# Patient Record
Sex: MALE | Race: WHITE | NOT HISPANIC OR LATINO | Employment: OTHER | URBAN - METROPOLITAN AREA
[De-identification: names, ages, dates, MRNs, and addresses within clinical notes are randomized per-mention and may not be internally consistent; named-entity substitution may affect disease eponyms.]

---

## 2017-01-02 ENCOUNTER — HOSPITAL ENCOUNTER (OUTPATIENT)
Dept: PHYSICAL THERAPY | Facility: HOSPITAL | Age: 72
Setting detail: THERAPIES SERIES
Discharge: HOME | End: 2017-01-02

## 2017-01-02 DIAGNOSIS — Z98.1 STATUS POST LUMBAR SPINAL FUSION: Primary | ICD-10-CM

## 2017-01-02 PROCEDURE — 97113 AQUATIC THERAPY/EXERCISES: CPT | Performed by: PHYSICAL THERAPIST

## 2017-01-02 NOTE — PROGRESS NOTES
Outpatient Physical Therapy Ortho Re-Assessment  [facility]     Patient Name: Ruben Breen  : 1945  MRN: [id number]  Today's Date: 2017      Visit Date: 2017    There is no problem list on file for this patient.       No past medical history on file.     No past surgical history on file.    Visit Dx:     ICD-10-CM ICD-9-CM   1. Status post lumbar spinal fusion Z98.1 V45.4                                     PT OP Goals       17 1200 16 0900       PT Short Term Goals    STG Date to Achieve 16  -MEI 16  -MEI     STG 1 Patient to complete a 45 minute aquatic exercise program without exacerbation of symptoms.  -MEI Patient to complete a 45 minute aquatic exercise program without exacerbation of symptoms.  -MEI     STG 1 Progress Met  -MEI Met  -MEI     STG 2 Patient to ambulate independently without an assistive device safely.  -MEI Patient to ambulate independently without an assistive device safely.  -MEI     STG 2 Progress Met  -MEI Met  -MEI     Long Term Goals    LTG Date to Achieve 16  -MEI 16  -MEI     LTG 1 Through teach back method, patient to be independent in completing an exercise program to increase fllexibility and increase strength so he can ambulate farther.  -MEI Through teach back method, patient to be independent in completing an exercise program to increase fllexibility and increase strength so he can ambulate farther.  -MEI     LTG 1 Progress Partially Met  -MEI Partially Met  -MEI     LTG 1 Progress Comments  Independent with 80% of current exercises, program being progressed weekly.  -MEI     LTG 2 Patient to demonstrate increased lower extremity strength as is evidenced by improving 5x sit to stand score from 24 seconds to 18 seconds or less, without use of UEs.  -MEI Patient to demonstrate increased lower extremity strength as is evidenced by improving 5x sit to stand score from 24 seconds to 18 seconds or less, without use of UEs.  -MEI     LTG 2  Progress Met  -MEI Met  -MEI     LTG 3 Patient to be able to complete more activities of daily living as is evidenced by improving score on Oswestry Disability Index from 48% disability to less than 40% disability.  -MEI Patient to be able to complete more activities of daily living as is evidenced by improving score on Oswestry Disability Index from 48% disability to less than 40% disability.  -MEI     LTG 3 Progress Met  -MEI Progressing  -MEI     LTG 3 Progress Comments THOMAS 36%  -MEI      LTG 4 Improve proximal strength demonstrated by improving the 5 X sit to stand test from 18 sec. to 14 sec.  -MEI Improve proximal strength demonstrated by improving the 5 X sit to stand test from 18 sec. to 14 sec.  -MEI     LTG 4 Progress Met  -MEI New  -MEI     LTG 4 Progress Comments Completed in 9.5 sec.  -MEI        User Key  (r) = Recorded By, (t) = Taken By, (c) = Cosigned By    Initials Name Provider Type    MEI Orellana, PT Physical Therapist                PT Assessment/Plan       01/02/17 1242          PT Assessment    Functional Limitations Limitation in home management;Limitations in community activities;Performance in leisure activities  -MEI      Impairments Endurance;Gait;Muscle strength;Pain;Range of motion;Posture  -MEI      Assessment Comments Mr Jamshid demonstrates good progress from one month ago. His back pain is little to none depending on his activity. He continues to walk almost daily. He reports continued R knee pain (that has been long standing). Oswestry demonstrates improvement from 48% to 36%.  The 5 X sit to stand test improved significantly from initial time of  24 seconds to 9.5 seconds (WNL) today.    -MEI      Please refer to paper survey for additional self-reported information Yes  -MEI      Rehab Potential Good  -MEI      Patient/caregiver participated in establishment of treatment plan and goals Yes  -MEI      Patient would benefit from skilled therapy intervention Yes  -MEI      PT Plan    PT Frequency  2x/week  -MEI      Predicted Duration of Therapy Intervention (days/wks) 1-2 weeks  -MEI      Planned CPT's? PT AQUATIC THERAPY EA 15 MIN: [id number]  -MEI      Physical Therapy Interventions (Optional Details) aquatics exercise;lumbar stabilization;strengthening;stretching  -MEI      PT Plan Comments 1-2 more sessions to finalize program.  -MEI        User Key  (r) = Recorded By, (t) = Taken By, (c) = Cosigned By    Initials Name Provider Type    MEI Orellana, PT Physical Therapist                  Exercises       01/02/17 1200          Subjective Comments    Subjective Comments Saw the surgeon and I can return to physical activity, but no push ups or jogging and weight lifting only with back support such as lying or leaning against bench.  -MEI      Subjective Pain    Able to rate subjective pain? yes  -MEI      Pre-Treatment Pain Level 3  -MEI      Post-Treatment Pain Level 3  -MEI      Subjective Pain Comment My back only bothers me if I move the wrong way, but my knee is still hurting and grinding. It does that it goes in cycles or could be the weather.  -MEI      Aquatics    Aquatics performed? Yes  -MEI      Exercise 1    Exercise Name 1 Refer to flow sheet. Progressed to hydrotone bells.  -MEI        User Key  (r) = Recorded By, (t) = Taken By, (c) = Cosigned By    Initials Name Provider Type    MEI Orellana, PT Physical Therapist                              Time Calculation:   Start Time: 0900  Stop Time: 0940  Time Calculation (min): 40 min     Therapy Charges for Today     Code Description Service Date Service Provider Modifiers Qty    32779921599  PT AQUATIC THERAPY EA 15 MIN 1/2/2017 Dameon Orellana, PT GP 3                    Dameon Orellana, PT  1/2/2017

## 2017-01-04 ENCOUNTER — HOSPITAL ENCOUNTER (OUTPATIENT)
Dept: PHYSICAL THERAPY | Facility: HOSPITAL | Age: 72
Setting detail: THERAPIES SERIES
End: 2017-01-04

## 2017-01-09 ENCOUNTER — HOSPITAL ENCOUNTER (OUTPATIENT)
Dept: PHYSICAL THERAPY | Facility: HOSPITAL | Age: 72
Setting detail: THERAPIES SERIES
Discharge: HOME | End: 2017-01-09

## 2017-01-09 DIAGNOSIS — Z98.1 STATUS POST LUMBAR SPINAL FUSION: Primary | ICD-10-CM

## 2017-01-09 PROCEDURE — G8979 MOBILITY GOAL STATUS: HCPCS | Performed by: PHYSICAL THERAPIST

## 2017-01-09 PROCEDURE — 97113 AQUATIC THERAPY/EXERCISES: CPT | Performed by: PHYSICAL THERAPIST

## 2017-01-09 PROCEDURE — G8980 MOBILITY D/C STATUS: HCPCS | Performed by: PHYSICAL THERAPIST

## 2017-01-09 NOTE — THERAPY DISCHARGE NOTE
Outpatient Physical Therapy Discharge Summary  [facility]       Patient Name: Ruben Breen  : 1945  MRN: [id number]    Today's Date: 2017              PT OP Goals       17 0900 17 1200       PT Short Term Goals    STG Date to Achieve 16  -MEI 16  -MEI     STG 1 Patient to complete a 45 minute aquatic exercise program without exacerbation of symptoms.  -MEI Patient to complete a 45 minute aquatic exercise program without exacerbation of symptoms.  -MEI     STG 1 Progress Met  -MEI Met  -MEI     STG 2 Patient to ambulate independently without an assistive device safely.  -MEI Patient to ambulate independently without an assistive device safely.  -MEI     STG 2 Progress Met  -MEI Met  -MEI     Long Term Goals    LTG Date to Achieve 16  -MEI 16  -MEI     LTG 1 Through teach back method, patient to be independent in completing an exercise program to increase fllexibility and increase strength so he can ambulate farther.  -MEI Through teach back method, patient to be independent in completing an exercise program to increase fllexibility and increase strength so he can ambulate farther.  -MEI     LTG 1 Progress Met  -MEI Partially Met  -MEI     LTG 2 Patient to demonstrate increased lower extremity strength as is evidenced by improving 5x sit to stand score from 24 seconds to 18 seconds or less, without use of UEs.  -MEI Patient to demonstrate increased lower extremity strength as is evidenced by improving 5x sit to stand score from 24 seconds to 18 seconds or less, without use of UEs.  -MEI     LTG 2 Progress Met  -MEI Met  -MEI     LTG 3 Patient to be able to complete more activities of daily living as is evidenced by improving score on Oswestry Disability Index from 48% disability to less than 40% disability.  -MEI Patient to be able to complete more activities of daily living as is evidenced by improving score on Oswestry Disability Index from 48% disability to less than 40%  disability.  -MEI     LTG 3 Progress Met  -MEI Met  -MEI     LTG 3 Progress Comments  THOMAS 36%  -MEI     LTG 4 Improve proximal strength demonstrated by improving the 5 X sit to stand test from 18 sec. to 14 sec.  -MIE Improve proximal strength demonstrated by improving the 5 X sit to stand test from 18 sec. to 14 sec.  -MEI     LTG 4 Progress Met  -MEI Met  -MEI     LTG 4 Progress Comments  Completed in 9.5 sec.  -MEI       User Key  (r) = Recorded By, (t) = Taken By, (c) = Cosigned By    Initials Name Provider Type    MEI Orellana, PT Physical Therapist          OP PT Discharge Summary  Date of Discharge: 01/09/17  Reason for Discharge: All goals achieved  Outcomes Achieved: Able to achieve all goals within established timeline  Discharge Destination: Home with home program  Discharge Instructions: Mr Breen has made steady progress throughout the course of physical therapy.  His back pain has reduced to little to none at times.  He reports continued right knee pain and restricted ROM that has been long standing reportedly due to need for revision surgery.  Mr Breen was encouraged to continue with aquatic exercise  and change his land based walking program  to water walking, he is in agreement.        Time Calculation:   Start Time: 0902  Stop Time: 0945  Time Calculation (min): 43 min    Therapy Charges for Today     Code Description Service Date Service Provider Modifiers Qty    90089601901 HC PT AQUATIC THERAPY EA 15 MIN 1/9/2017 Dameon Orellana, PT GP 3    99305025989 HC PT MOBILITY PROJECTED 1/9/2017 Dameon Orellana, PT GP, CJ 1    [id number] HC PT MOBILITY DISCHARGE 1/9/2017 Dameon Orellana, PT GP, CJ 1          PT G-Codes  PT Professional Judgement Used?: Yes  Outcome Measure Options: 5x Sit to Stand, Modifed Owestry  Score: THOMAS 36%  Functional Limitation: Mobility: Walking and moving around  Mobility: Walking and Moving Around Goal Status (): At least 20 percent but less than 40 percent impaired, limited or  restricted  Mobility: Walking and Moving Around Discharge Status (): At least 20 percent but less than 40 percent impaired, limited or restricted       Dameon Orellana, PT  1/9/2017

## 2017-01-11 ENCOUNTER — APPOINTMENT (OUTPATIENT)
Dept: PHYSICAL THERAPY | Facility: HOSPITAL | Age: 72
End: 2017-01-11

## 2017-01-16 ENCOUNTER — APPOINTMENT (OUTPATIENT)
Dept: PHYSICAL THERAPY | Facility: HOSPITAL | Age: 72
End: 2017-01-16

## 2021-02-22 ENCOUNTER — TELEPHONE (OUTPATIENT)
Dept: GASTROENTEROLOGY | Facility: CLINIC | Age: 76
End: 2021-02-22

## 2021-02-22 NOTE — TELEPHONE ENCOUNTER
2/22/21 - I spoke to his niece, Dr. Jeanette Ceja, yesterday that he just finished XRT for prostate cancer and is having some rectal troubles. His doctors at the [facility] were recommending that he see a Colon Rectal surgeon and possibly have hemorrhoid surgery. He wants a second opinion.     RW - please work him in to see me in the next two weeks. thx.kjh

## 2021-05-20 ENCOUNTER — TELEPHONE (OUTPATIENT)
Dept: GASTROENTEROLOGY | Facility: CLINIC | Age: 76
End: 2021-05-20

## 2021-05-20 NOTE — TELEPHONE ENCOUNTER
5/20/21 - RW - I spoke to his niece, Dr. Jeanette Ceja, recently that he just finished XRT for prostate cancer and is having some rectal troubles. His doctors at the [facility] were recommending that he see a Colon Rectal surgeon and possibly have hemorrhoid surgery. He wants a second opinion.      RW - please work him in to see me in the next few weeks if possible. thx.kjh

## 2021-06-16 ENCOUNTER — OFFICE VISIT (OUTPATIENT)
Dept: GASTROENTEROLOGY | Facility: CLINIC | Age: 76
End: 2021-06-16

## 2021-06-16 ENCOUNTER — LAB (OUTPATIENT)
Dept: LAB | Facility: HOSPITAL | Age: 76
End: 2021-06-16

## 2021-06-16 VITALS
SYSTOLIC BLOOD PRESSURE: 140 MMHG | HEIGHT: 69 IN | DIASTOLIC BLOOD PRESSURE: 80 MMHG | BODY MASS INDEX: 30.66 KG/M2 | WEIGHT: 207 LBS

## 2021-06-16 DIAGNOSIS — K59.00 CONSTIPATION, UNSPECIFIED CONSTIPATION TYPE: Primary | ICD-10-CM

## 2021-06-16 DIAGNOSIS — K64.4 RESIDUAL HEMORRHOIDAL SKIN TAGS: ICD-10-CM

## 2021-06-16 LAB
ALBUMIN SERPL-MCNC: 4.6 G/DL (ref 3.5–5.2)
ALBUMIN/GLOB SERPL: 1.8 G/DL
ALP SERPL-CCNC: 74 U/L (ref 39–117)
ALT SERPL W P-5'-P-CCNC: 21 U/L (ref 1–41)
ANION GAP SERPL CALCULATED.3IONS-SCNC: 11.9 MMOL/L (ref 5–15)
AST SERPL-CCNC: 19 U/L (ref 1–40)
BASOPHILS # BLD AUTO: 0.03 10*3/MM3 (ref 0–0.2)
BASOPHILS NFR BLD AUTO: 0.5 % (ref 0–1.5)
BILIRUB SERPL-MCNC: 0.5 MG/DL (ref 0–1.2)
BUN SERPL-MCNC: 22 MG/DL (ref 8–23)
BUN/CREAT SERPL: 17.1 (ref 7–25)
CALCIUM SPEC-SCNC: 10.3 MG/DL (ref 8.6–10.5)
CHLORIDE SERPL-SCNC: 106 MMOL/L (ref 98–107)
CO2 SERPL-SCNC: 27.1 MMOL/L (ref 22–29)
CREAT SERPL-MCNC: 1.29 MG/DL (ref 0.76–1.27)
DEPRECATED RDW RBC AUTO: 47.1 FL (ref 37–54)
EOSINOPHIL # BLD AUTO: 0.25 10*3/MM3 (ref 0–0.4)
EOSINOPHIL NFR BLD AUTO: 4 % (ref 0.3–6.2)
ERYTHROCYTE [DISTWIDTH] IN BLOOD BY AUTOMATED COUNT: 13.3 % (ref 12.3–15.4)
GFR SERPL CREATININE-BSD FRML MDRD: 54 ML/MIN/1.73
GLOBULIN UR ELPH-MCNC: 2.6 GM/DL
GLUCOSE SERPL-MCNC: 107 MG/DL (ref 65–99)
HCT VFR BLD AUTO: 40.9 % (ref 37.5–51)
HGB BLD-MCNC: 13.5 G/DL (ref 13–17.7)
IMM GRANULOCYTES # BLD AUTO: 0.02 10*3/MM3 (ref 0–0.05)
IMM GRANULOCYTES NFR BLD AUTO: 0.3 % (ref 0–0.5)
LYMPHOCYTES # BLD AUTO: 1.06 10*3/MM3 (ref 0.7–3.1)
LYMPHOCYTES NFR BLD AUTO: 17 % (ref 19.6–45.3)
MCH RBC QN AUTO: 31.7 PG (ref 26.6–33)
MCHC RBC AUTO-ENTMCNC: 33 G/DL (ref 31.5–35.7)
MCV RBC AUTO: 96 FL (ref 79–97)
MONOCYTES # BLD AUTO: 0.42 10*3/MM3 (ref 0.1–0.9)
MONOCYTES NFR BLD AUTO: 6.7 % (ref 5–12)
NEUTROPHILS NFR BLD AUTO: 4.45 10*3/MM3 (ref 1.7–7)
NEUTROPHILS NFR BLD AUTO: 71.5 % (ref 42.7–76)
NRBC BLD AUTO-RTO: 0 /100 WBC (ref 0–0.2)
PLATELET # BLD AUTO: 200 10*3/MM3 (ref 140–450)
PMV BLD AUTO: 10.4 FL (ref 6–12)
POTASSIUM SERPL-SCNC: 4.6 MMOL/L (ref 3.5–5.2)
PROT SERPL-MCNC: 7.2 G/DL (ref 6–8.5)
RBC # BLD AUTO: 4.26 10*6/MM3 (ref 4.14–5.8)
SODIUM SERPL-SCNC: 145 MMOL/L (ref 136–145)
TSH SERPL DL<=0.05 MIU/L-ACNC: 1.5 UIU/ML (ref 0.27–4.2)
WBC # BLD AUTO: 6.23 10*3/MM3 (ref 3.4–10.8)

## 2021-06-16 PROCEDURE — 99204 OFFICE O/P NEW MOD 45 MIN: CPT | Performed by: INTERNAL MEDICINE

## 2021-06-16 PROCEDURE — 83516 IMMUNOASSAY NONANTIBODY: CPT | Performed by: INTERNAL MEDICINE

## 2021-06-16 PROCEDURE — 36415 COLL VENOUS BLD VENIPUNCTURE: CPT | Performed by: INTERNAL MEDICINE

## 2021-06-16 PROCEDURE — 82784 ASSAY IGA/IGD/IGG/IGM EACH: CPT | Performed by: INTERNAL MEDICINE

## 2021-06-16 PROCEDURE — 80053 COMPREHEN METABOLIC PANEL: CPT | Performed by: INTERNAL MEDICINE

## 2021-06-16 PROCEDURE — 84443 ASSAY THYROID STIM HORMONE: CPT | Performed by: INTERNAL MEDICINE

## 2021-06-16 PROCEDURE — 85025 COMPLETE CBC W/AUTO DIFF WBC: CPT | Performed by: INTERNAL MEDICINE

## 2021-06-16 RX ORDER — FINASTERIDE 1 MG/1
1 TABLET, FILM COATED ORAL DAILY
COMMUNITY
End: 2021-09-16

## 2021-06-16 RX ORDER — TRAMADOL HYDROCHLORIDE 50 MG/1
50 TABLET ORAL EVERY 6 HOURS PRN
COMMUNITY

## 2021-06-16 RX ORDER — GABAPENTIN 400 MG/1
400 CAPSULE ORAL DAILY PRN
COMMUNITY

## 2021-06-16 RX ORDER — GLIPIZIDE 10 MG/1
10 TABLET ORAL
COMMUNITY
End: 2022-06-09 | Stop reason: DRUGHIGH

## 2021-06-16 RX ORDER — AMMONIUM LACTATE 120 MG/G
1 CREAM TOPICAL AS NEEDED
COMMUNITY

## 2021-06-16 RX ORDER — DOCUSATE SODIUM 250 MG
250 CAPSULE ORAL DAILY
COMMUNITY

## 2021-06-16 RX ORDER — LISINOPRIL 2.5 MG/1
2.5 TABLET ORAL DAILY
COMMUNITY

## 2021-06-16 RX ORDER — TRAZODONE HYDROCHLORIDE 50 MG/1
50 TABLET ORAL NIGHTLY
COMMUNITY

## 2021-06-16 RX ORDER — TAMSULOSIN HYDROCHLORIDE 0.4 MG/1
1 CAPSULE ORAL DAILY
COMMUNITY

## 2021-06-16 RX ORDER — COLCHICINE 0.6 MG/1
0.6 TABLET ORAL DAILY
COMMUNITY

## 2021-06-16 RX ORDER — CLINDAMYCIN PHOSPHATE 10 MG/G
1 GEL TOPICAL 2 TIMES DAILY
COMMUNITY

## 2021-06-16 RX ORDER — ATORVASTATIN CALCIUM 20 MG/1
20 TABLET, FILM COATED ORAL DAILY
COMMUNITY

## 2021-06-16 RX ORDER — POTASSIUM CITRATE 10 MEQ/1
10 TABLET, EXTENDED RELEASE ORAL DAILY PRN
COMMUNITY

## 2021-06-16 RX ORDER — DIAPER,BRIEF,INFANT-TODD,DISP
1 EACH MISCELLANEOUS 2 TIMES DAILY
COMMUNITY

## 2021-06-16 NOTE — PROGRESS NOTES
Chief Complaint   Patient presents with   • Hemorrhoids       History of Present Illness:   75 y.o. male who is related to Dr. 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       He has prostate cancer diagnosed in 9/20. He is on hormonal therapy now. He did have radiation therapy (finished 11/20). He has had hemorrhoidal banding 1 yr ago by [facility]. The Colon Rectal Surgeon was recommending hemorrhoid surgery to be done again to removed hemorrhoids that have been left over. They are worried that he will be incontinent after surgery. NO rectal bleeding. When he has a large BM he will notice something sticking out of his anus. He may have trouble cleaning himself after BM sometimes. He is on 3 Docusate pills/day and Miralax daily. Prior to being on Docusate and Miralax he was constipated. He had had loose BM on the docusate/Miralax.  The docusate has been stopped by his PCP, Dr. Russell. One BM yesterday and today, neither one was loose like before. No anal pain. No abdominal or chest pain. No nausea or vomiting. NO fevers, chills. Weight stable.        I did review his [facility] records (112 pages).    Past Medical History:   Diagnosis Date   • Diabetes mellitus (CMS/HCC)    • Hypertension    • Kidney stones    • Prostate cancer (CMS/HCC) 09/2020       Past Surgical History:   Procedure Laterality Date   • COLONOSCOPY  2020   • KNEE SURGERY  2014   • KNEE  SURGERY  2017   • NEPHRECTOMY  1990   • SPINE SURGERY  2014   • SPINE SURGERY  2018         Current Outpatient Medications:   •  ammonium lactate (AMLACTIN) 12 % cream, Apply  topically to the appropriate area as directed As Needed for Dry Skin., Disp: , Rfl:   •  atorvastatin (LIPITOR) 20 MG tablet, Take 20 mg by mouth Daily., Disp: , Rfl:   •  clindamycin (CLINDAGEL) 1 % gel, Apply  topically to the appropriate area as directed 2 (Two) Times a Day., Disp: , Rfl:   •  colchicine 0.6 MG tablet, Take 0.6 mg by mouth Daily., Disp: , Rfl:   •  diclofenac sodium (VOTAREN XR) 100 MG 24 hr tablet, Take 100 mg by mouth Daily., Disp: , Rfl:   •  docusate sodium (COLACE) 250 MG capsule, Take 250 mg by mouth Daily., Disp: , Rfl:   •  finasteride (PROPECIA) 1 MG tablet, Take 1 mg by mouth Daily., Disp: , Rfl:   •  gabapentin (NEURONTIN) 400 MG capsule, Take 400 mg by mouth 3 (Three) Times a Day., Disp: , Rfl:   •  glipizide (GLUCOTROL) 10 MG tablet, Take 10 mg by mouth 2 (Two) Times a Day Before Meals., Disp: , Rfl:   •  hydrocortisone 1 % cream, Apply  topically to the appropriate area as directed 2 (Two) Times a Day., Disp: , Rfl:   •  lisinopril (PRINIVIL,ZESTRIL) 2.5 MG tablet, Take 2.5 mg by mouth Daily., Disp: , Rfl:   •  potassium citrate (UROCIT-K) 10 MEQ (1080 MG) CR tablet, Take 10 mEq by mouth 3 (Three) Times a Day With Meals., Disp: , Rfl:   •  tamsulosin (FLOMAX) 0.4 MG capsule 24 hr capsule, Take 1 capsule by mouth Daily., Disp: , Rfl:   •  traMADol (ULTRAM) 50 MG tablet, Take 50 mg by mouth Every 6 (Six) Hours As Needed for Moderate Pain ., Disp: , Rfl:   •  traZODone (DESYREL) 50 MG tablet, Take 50 mg by mouth Every Night., Disp: , Rfl:     No Known Allergies    History reviewed. No pertinent family history.    Social History     Socioeconomic History   • Marital status:      Spouse name: Not on file   • Number of children: Not on file   • Years of education: Not on file   • Highest education level: Not  on file   Tobacco Use   • Smoking status: Never Smoker   • Smokeless tobacco: Never Used   Substance and Sexual Activity   • Alcohol use: Never   • Drug use: Never       Review of Systems   Gastrointestinal: Positive for constipation. Negative for abdominal pain.     Pertinent positives and negatives documented in the HPI and all other systems reviewed and were found to be negative.  Vitals:    06/16/21 1107   BP: 140/80       Physical Exam  Vitals reviewed.   Constitutional:       General: He is not in acute distress.     Appearance: Normal appearance. He is well-developed. He is not diaphoretic.   HENT:      Head: Normocephalic and atraumatic. Hair is normal.      Right Ear: Hearing, tympanic membrane, ear canal and external ear normal.      Left Ear: Hearing, tympanic membrane, ear canal and external ear normal.      Nose: Nose normal. No nasal deformity.      Mouth/Throat:      Mouth: Mucous membranes are moist. No oral lesions.      Pharynx: Uvula midline. No uvula swelling.   Eyes:      General: Lids are normal. No scleral icterus.        Right eye: No discharge.         Left eye: No discharge.      Extraocular Movements: Extraocular movements intact.      Right eye: Normal extraocular motion and no nystagmus.      Left eye: Normal extraocular motion and no nystagmus.      Conjunctiva/sclera: Conjunctivae normal.      Pupils: Pupils are equal, round, and reactive to light.   Neck:      Thyroid: No thyromegaly.      Vascular: No JVD.   Cardiovascular:      Rate and Rhythm: Normal rate and regular rhythm.      Pulses: Normal pulses.      Heart sounds: Normal heart sounds. No murmur heard.   No gallop.    Pulmonary:      Effort: Pulmonary effort is normal. No respiratory distress.      Breath sounds: Normal breath sounds. No wheezing or rales.   Chest:      Chest wall: No tenderness.   Abdominal:      General: Bowel sounds are normal. There is no distension.      Palpations: Abdomen is soft. There is no mass.      " Tenderness: There is no abdominal tenderness. There is no guarding.      Hernia: No hernia is present.   Genitourinary:     Rectum: Guaiac result negative.      Comments: External hemorrhoids, soft  Musculoskeletal:         General: No tenderness or deformity. Normal range of motion.      Cervical back: Normal range of motion and neck supple.   Lymphadenopathy:      Cervical: No cervical adenopathy.   Skin:     General: Skin is warm and dry.      Findings: No rash.   Neurological:      Mental Status: He is alert and oriented to person, place, and time.      Cranial Nerves: No cranial nerve deficit.      Motor: No abnormal muscle tone.      Coordination: Coordination normal.      Deep Tendon Reflexes: Reflexes are normal and symmetric. Reflexes normal.   Psychiatric:         Mood and Affect: Mood normal.         Behavior: Behavior normal.         Thought Content: Thought content normal.         Judgment: Judgment normal.         Diagnoses and all orders for this visit:    1. Constipation, unspecified constipation type (Primary)  -     CBC & Differential  -     Celiac Ab tTG DGP TIgA  -     Comprehensive Metabolic Panel  -     TSH    2. Residual hemorrhoidal skin tags  -     CBC & Differential  -     Celiac Ab tTG DGP TIgA  -     Comprehensive Metabolic Panel  -     TSH      Assessment:  1. H/o hemorrhoids and \"problems\" cleaning himself after BM  2. Consitpation  3.     Recommendations:  1. Hold off on hemorrhoid surgery for now. He does not want to have hemorrhoid surgery now because of fear of potential side effect of stool incontinence.   2. Continue off of the docusate sodium but continue the Miralax daily to decrease constiaption. I also want to decrease his loose bowels which seem to make cleaning himself after BM so difficult.  He can adjust the MiraLAX to aim for 1 bowel movement a day but I would like to make his bowels less loose.  3. F/u 3 mos.   4. CBC, CMP, TSH, celiac sprue    No follow-ups on " file.    Valentino Santiago MD  6/16/2021

## 2021-06-17 LAB
GLIADIN PEPTIDE IGA SER-ACNC: 5 UNITS (ref 0–19)
GLIADIN PEPTIDE IGG SER-ACNC: 1 UNITS (ref 0–19)
IGA SERPL-MCNC: 171 MG/DL (ref 61–437)
TTG IGA SER-ACNC: <2 U/ML (ref 0–3)
TTG IGG SER-ACNC: <2 U/ML (ref 0–5)

## 2021-06-18 NOTE — PROGRESS NOTES
06/18/21       Tell him that the following lab tests came back normal: Celiac sprue antibody panel, thyroid-stimulating hormone, and CBC.  His comprehensive metabolic profile shows that his glucose is 107 (which is just mildly elevated) and his serum creatinine is 1.29 (which is similar to what it was about one year ago at the [facility]).       Please fax a copy of this report to his PCP, Dr. Russell.  Thx. kj

## 2021-06-21 ENCOUNTER — TELEPHONE (OUTPATIENT)
Dept: GASTROENTEROLOGY | Facility: CLINIC | Age: 76
End: 2021-06-21

## 2021-06-21 NOTE — TELEPHONE ENCOUNTER
Called pt and advised of Dr Santiago's note. Verb understanding.      Records sent to Dr Novak thru [facility].

## 2021-06-21 NOTE — TELEPHONE ENCOUNTER
----- Message from Valentino Santiago MD sent at 6/18/2021  4:45 PM EDT -----  06/18/21       Tell him that the following lab tests came back normal: Celiac sprue antibody panel, thyroid-stimulating hormone, and CBC.  His comprehensive metabolic profile shows that his glucose is 107 (which is just mildly elevated) and his serum creatinine is 1.29 (which is similar to what it was about one year ago at the [facility]).       Please fax a copy of this report to his PCP, Dr. Russell.  Thx. [facility]

## 2021-09-16 ENCOUNTER — OFFICE VISIT (OUTPATIENT)
Dept: GASTROENTEROLOGY | Facility: CLINIC | Age: 76
End: 2021-09-16

## 2021-09-16 VITALS
TEMPERATURE: 97.2 F | BODY MASS INDEX: 30.51 KG/M2 | WEIGHT: 206 LBS | DIASTOLIC BLOOD PRESSURE: 74 MMHG | SYSTOLIC BLOOD PRESSURE: 128 MMHG | HEIGHT: 69 IN

## 2021-09-16 DIAGNOSIS — D64.9 ANEMIA, UNSPECIFIED TYPE: Primary | ICD-10-CM

## 2021-09-16 DIAGNOSIS — K63.5 POLYP OF COLON, UNSPECIFIED PART OF COLON, UNSPECIFIED TYPE: ICD-10-CM

## 2021-09-16 DIAGNOSIS — K59.00 CONSTIPATION, UNSPECIFIED CONSTIPATION TYPE: ICD-10-CM

## 2021-09-16 PROBLEM — Z98.890 HX OF LUMBOSACRAL SPINE SURGERY: Status: ACTIVE | Noted: 2017-04-11

## 2021-09-16 PROBLEM — IMO0002 PSEUDOARTHROSIS OF SPINE: Status: ACTIVE | Noted: 2021-07-30

## 2021-09-16 PROBLEM — M17.12 ARTHRITIS OF LEFT KNEE: Status: ACTIVE | Noted: 2018-03-05

## 2021-09-16 PROBLEM — M54.6 PAIN IN THORACIC SPINE: Status: ACTIVE | Noted: 2021-07-02

## 2021-09-16 PROBLEM — M17.12 PRIMARY OSTEOARTHRITIS OF LEFT KNEE: Status: ACTIVE | Noted: 2018-02-02

## 2021-09-16 PROBLEM — T84.019A PROSTHETIC JOINT IMPLANT FAILURE (HCC): Status: ACTIVE | Noted: 2018-04-17

## 2021-09-16 PROBLEM — M00.80: Status: ACTIVE | Noted: 2021-07-02

## 2021-09-16 PROBLEM — T84.216A HARDWARE FAILURE OF ANTERIOR COLUMN OF SPINE (HCC): Status: ACTIVE | Noted: 2021-07-02

## 2021-09-16 PROBLEM — M54.50 LUMBAR PAIN: Status: ACTIVE | Noted: 2021-07-02

## 2021-09-16 PROBLEM — B96.5: Status: ACTIVE | Noted: 2021-07-02

## 2021-09-16 PROBLEM — Z96.651 S/P TOTAL KNEE REPLACEMENT, RIGHT: Status: ACTIVE | Noted: 2018-05-02

## 2021-09-16 PROBLEM — Z98.1 HISTORY OF FUSION OF THORACIC SPINE: Status: ACTIVE | Noted: 2021-07-02

## 2021-09-16 PROCEDURE — 99214 OFFICE O/P EST MOD 30 MIN: CPT | Performed by: INTERNAL MEDICINE

## 2021-09-16 RX ORDER — FINASTERIDE 5 MG/1
5 TABLET, FILM COATED ORAL DAILY
COMMUNITY

## 2021-09-16 RX ORDER — ALBUTEROL SULFATE 90 UG/1
2 AEROSOL, METERED RESPIRATORY (INHALATION) 4 TIMES DAILY
COMMUNITY
Start: 2021-08-18 | End: 2022-05-19

## 2021-09-16 NOTE — PROGRESS NOTES
"Chief Complaint   Patient presents with   • Follow-up       History of Present Illness:   75 y.o. male who is related to Dr. 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  The patient was uptight about potential complications with the second surgery.  At that time my assessment and recommendations were as follows:  Assessment:  1. H/o hemorrhoids and \"problems\" cleaning himself after BM  2. Consitpation  3.      Recommendations:  1. Hold off on hemorrhoid surgery for now. He does not want to have hemorrhoid surgery now because of fear of potential side effect of stool incontinence.   2. Continue off of the docusate sodium but continue the Miralax daily to decrease constiaption. I also want to decrease his loose bowels which seem to make cleaning himself after BM so difficult.  He can adjust the MiraLAX to aim for 1 bowel movement a day but I would like to make his bowels less loose.  3. F/u 3 mos.   4. CBC, CMP, TSH, celiac sprue        Stopping Docusate helped making the stool more formed. Still with problems cleaning himself. No constipation. No diarrhea. No rectal bleeding or melena. No abdominal or chest pain. No nausea or vomiting. Weight unchanged.  Takes Miralax daily.        4 weeks ago he had emergency spine surgery (#6 surgery in his back) because one of his spine rods broke. He also had a UTI for which he went to the [facility] ER. He went home. No back pain.      Past Medical History:   Diagnosis Date   • Diabetes mellitus (CMS/HCC)    • Hypertension    • Kidney stones    • Prostate cancer " (CMS/[facility]) 09/2020       Past Surgical History:   Procedure Laterality Date   • COLONOSCOPY  2020   • KNEE SURGERY  2014   • KNEE SURGERY  2017   • NEPHRECTOMY  1990   • SPINE SURGERY  2014   • SPINE SURGERY  2018         Current Outpatient Medications:   •  albuterol sulfate  (90 Base) MCG/ACT inhaler, Inhale 2 puffs 4 (Four) Times a Day., Disp: , Rfl:   •  ammonium lactate (AMLACTIN) 12 % cream, Apply  topically to the appropriate area as directed As Needed for Dry Skin., Disp: , Rfl:   •  Aspirin 81 MG capsule, Take 1 capsule by mouth Daily., Disp: , Rfl:   •  atorvastatin (LIPITOR) 20 MG tablet, Take 20 mg by mouth Daily., Disp: , Rfl:   •  bevacizumab (AVASTIN) 100 MG/4ML chemo injection, Inject 1 mg into the eye Every 3 (Three) Months., Disp: , Rfl:   •  clindamycin (CLINDAGEL) 1 % gel, Apply  topically to the appropriate area as directed 2 (Two) Times a Day., Disp: , Rfl:   •  colchicine 0.6 MG tablet, Take 0.6 mg by mouth Daily., Disp: , Rfl:   •  diclofenac sodium (VOTAREN XR) 100 MG 24 hr tablet, Take 100 mg by mouth Daily., Disp: , Rfl:   •  docusate sodium (COLACE) 250 MG capsule, Take 250 mg by mouth Daily., Disp: , Rfl:   •  finasteride (PROSCAR) 5 MG tablet, Take 5 mg by mouth Daily., Disp: , Rfl:   •  gabapentin (NEURONTIN) 400 MG capsule, Take 400 mg by mouth 3 (Three) Times a Day., Disp: , Rfl:   •  glipizide (GLUCOTROL) 10 MG tablet, Take 10 mg by mouth 2 (Two) Times a Day Before Meals., Disp: , Rfl:   •  hydrocortisone 1 % cream, Apply  topically to the appropriate area as directed 2 (Two) Times a Day., Disp: , Rfl:   •  lisinopril (PRINIVIL,ZESTRIL) 2.5 MG tablet, Take 2.5 mg by mouth Daily., Disp: , Rfl:   •  potassium citrate (UROCIT-K) 10 MEQ (1080 MG) CR tablet, Take 10 mEq by mouth 3 (Three) Times a Day With Meals., Disp: , Rfl:   •  tamsulosin (FLOMAX) 0.4 MG capsule 24 hr capsule, Take 1 capsule by mouth Daily., Disp: , Rfl:   •  traMADol (ULTRAM) 50 MG tablet, Take 50 mg by mouth  Every 6 (Six) Hours As Needed for Moderate Pain ., Disp: , Rfl:   •  traZODone (DESYREL) 50 MG tablet, Take 50 mg by mouth Every Night., Disp: , Rfl:     No Known Allergies    History reviewed. No pertinent family history.    Social History     Socioeconomic History   • Marital status:      Spouse name: Not on file   • Number of children: Not on file   • Years of education: Not on file   • Highest education level: Not on file   Tobacco Use   • Smoking status: Never Smoker   • Smokeless tobacco: Never Used   Vaping Use   • Vaping Use: Never used   Substance and Sexual Activity   • Alcohol use: Never   • Drug use: Never       Review of Systems   Gastrointestinal: Negative for abdominal pain, constipation and diarrhea.   All other systems reviewed and are negative.    Pertinent positives and negatives documented in the HPI and all other systems reviewed and were found to be negative.  Vitals:    09/16/21 0819   BP: 128/74   Temp: 97.2 °F (36.2 °C)       Physical Exam  Vitals reviewed.   Constitutional:       General: He is not in acute distress.     Appearance: Normal appearance. He is well-developed. He is not diaphoretic.   HENT:      Head: Normocephalic and atraumatic. Hair is normal.      Right Ear: Hearing, tympanic membrane, ear canal and external ear normal.      Left Ear: Hearing, tympanic membrane, ear canal and external ear normal.      Nose: Nose normal. No nasal deformity.      Mouth/Throat:      Mouth: Mucous membranes are moist. No oral lesions.      Pharynx: Uvula midline. No uvula swelling.   Eyes:      General: Lids are normal. No scleral icterus.        Right eye: No discharge.         Left eye: No discharge.      Extraocular Movements: Extraocular movements intact.      Right eye: Normal extraocular motion and no nystagmus.      Left eye: Normal extraocular motion and no nystagmus.      Conjunctiva/sclera: Conjunctivae normal.      Pupils: Pupils are equal, round, and reactive to light.    Neck:      Thyroid: No thyromegaly.      Vascular: No JVD.   Cardiovascular:      Rate and Rhythm: Normal rate and regular rhythm.      Pulses: Normal pulses.      Heart sounds: Normal heart sounds. No murmur heard.   No gallop.    Pulmonary:      Effort: Pulmonary effort is normal. No respiratory distress.      Breath sounds: Normal breath sounds. No wheezing or rales.   Chest:      Chest wall: No tenderness.   Abdominal:      General: Bowel sounds are normal. There is no distension.      Palpations: Abdomen is soft. There is no mass.      Tenderness: There is no abdominal tenderness. There is no guarding.      Hernia: No hernia is present.   Genitourinary:     Rectum: Normal. Guaiac result negative.   Musculoskeletal:         General: No tenderness or deformity. Normal range of motion.      Cervical back: Normal range of motion and neck supple.   Lymphadenopathy:      Cervical: No cervical adenopathy.   Skin:     General: Skin is warm and dry.      Findings: No rash.   Neurological:      Mental Status: He is alert and oriented to person, place, and time.      Cranial Nerves: No cranial nerve deficit.      Motor: No abnormal muscle tone.      Coordination: Coordination normal.      Deep Tendon Reflexes: Reflexes are normal and symmetric. Reflexes normal.   Psychiatric:         Mood and Affect: Mood normal.         Behavior: Behavior normal.         Thought Content: Thought content normal.         Judgment: Judgment normal.         Diagnoses and all orders for this visit:    1. Anemia, unspecified type (Primary)  -     CBC & Differential  -     Comprehensive Metabolic Panel  -     Ferritin  -     Folate RBC  -     Iron Profile  -     Vitamin B12  -     Protein Elec + Interp, Serum    2. Constipation, unspecified constipation type  -     CBC & Differential  -     Comprehensive Metabolic Panel  -     Ferritin  -     Folate RBC  -     Iron Profile  -     Vitamin B12  -     Protein Elec + Interp, Serum    3. Polyp of  colon, unspecified part of colon, unspecified type      Assessment:  1. Anemia  2. Constipation  3. H/o colon polyps. Last  C/s at the [facility] in 6/19. They recommended a repeat c/s in 24.  4. H/o kidney stones  5. H/o recent UTI.   6.     Recommendations:  1.  CBC, CMP, anemia labs  2. Continue the Miralax  3. F/u 3 mos    No follow-ups on file.    Valentino Santiago MD  9/16/2021

## 2021-09-17 LAB
ALBUMIN SERPL ELPH-MCNC: 3.4 G/DL (ref 2.9–4.4)
ALBUMIN SERPL-MCNC: 4.3 G/DL (ref 3.5–5.2)
ALBUMIN/GLOB SERPL: 0.9 {RATIO} (ref 0.7–1.7)
ALBUMIN/GLOB SERPL: 1.6 G/DL
ALP SERPL-CCNC: 88 U/L (ref 39–117)
ALPHA1 GLOB SERPL ELPH-MCNC: 0.3 G/DL (ref 0–0.4)
ALPHA2 GLOB SERPL ELPH-MCNC: 0.8 G/DL (ref 0.4–1)
ALT SERPL-CCNC: 19 U/L (ref 1–41)
AST SERPL-CCNC: 19 U/L (ref 1–40)
B-GLOBULIN SERPL ELPH-MCNC: 1 G/DL (ref 0.7–1.3)
BASOPHILS # BLD AUTO: 0.04 10*3/MM3 (ref 0–0.2)
BASOPHILS NFR BLD AUTO: 0.5 % (ref 0–1.5)
BILIRUB SERPL-MCNC: 0.3 MG/DL (ref 0–1.2)
BUN SERPL-MCNC: 24 MG/DL (ref 8–23)
BUN/CREAT SERPL: 19.5 (ref 7–25)
CALCIUM SERPL-MCNC: 10.5 MG/DL (ref 8.6–10.5)
CHLORIDE SERPL-SCNC: 105 MMOL/L (ref 98–107)
CO2 SERPL-SCNC: 28.7 MMOL/L (ref 22–29)
CREAT SERPL-MCNC: 1.23 MG/DL (ref 0.76–1.27)
EOSINOPHIL # BLD AUTO: 0.22 10*3/MM3 (ref 0–0.4)
EOSINOPHIL NFR BLD AUTO: 2.9 % (ref 0.3–6.2)
ERYTHROCYTE [DISTWIDTH] IN BLOOD BY AUTOMATED COUNT: 13.5 % (ref 12.3–15.4)
FERRITIN SERPL-MCNC: 61.2 NG/ML (ref 30–400)
FOLATE BLD-MCNC: >620 NG/ML
FOLATE RBC-MCNC: >1689 NG/ML
GAMMA GLOB SERPL ELPH-MCNC: 1.5 G/DL (ref 0.4–1.8)
GLOBULIN SER CALC-MCNC: 2.7 GM/DL
GLOBULIN SER CALC-MCNC: 3.6 G/DL (ref 2.2–3.9)
GLUCOSE SERPL-MCNC: 123 MG/DL (ref 65–99)
HCT VFR BLD AUTO: 36.7 % (ref 37.5–51)
HGB BLD-MCNC: 11.6 G/DL (ref 13–17.7)
IMM GRANULOCYTES # BLD AUTO: 0.05 10*3/MM3 (ref 0–0.05)
IMM GRANULOCYTES NFR BLD AUTO: 0.7 % (ref 0–0.5)
IRON SATN MFR SERPL: 14 % (ref 20–50)
IRON SERPL-MCNC: 55 MCG/DL (ref 59–158)
LABORATORY COMMENT REPORT: NORMAL
LYMPHOCYTES # BLD AUTO: 1.01 10*3/MM3 (ref 0.7–3.1)
LYMPHOCYTES NFR BLD AUTO: 13.3 % (ref 19.6–45.3)
M PROTEIN SERPL ELPH-MCNC: NORMAL G/DL
MCH RBC QN AUTO: 30.1 PG (ref 26.6–33)
MCHC RBC AUTO-ENTMCNC: 31.6 G/DL (ref 31.5–35.7)
MCV RBC AUTO: 95.1 FL (ref 79–97)
MONOCYTES # BLD AUTO: 0.54 10*3/MM3 (ref 0.1–0.9)
MONOCYTES NFR BLD AUTO: 7.1 % (ref 5–12)
NEUTROPHILS # BLD AUTO: 5.73 10*3/MM3 (ref 1.7–7)
NEUTROPHILS NFR BLD AUTO: 75.5 % (ref 42.7–76)
NRBC BLD AUTO-RTO: 0 /100 WBC (ref 0–0.2)
PLATELET # BLD AUTO: 270 10*3/MM3 (ref 140–450)
POTASSIUM SERPL-SCNC: 4.9 MMOL/L (ref 3.5–5.2)
PROT PATTERN SERPL ELPH-IMP: NORMAL
PROT SERPL-MCNC: 7 G/DL (ref 6–8.5)
RBC # BLD AUTO: 3.86 10*6/MM3 (ref 4.14–5.8)
SODIUM SERPL-SCNC: 143 MMOL/L (ref 136–145)
TIBC SERPL-MCNC: 398 MCG/DL
UIBC SERPL-MCNC: 343 MCG/DL (ref 112–346)
VIT B12 SERPL-MCNC: >2000 PG/ML (ref 211–946)
WBC # BLD AUTO: 7.59 10*3/MM3 (ref 3.4–10.8)

## 2021-09-19 NOTE — PROGRESS NOTES
09/19/21  Tell him that most of his labs looked good. He is mildly anemic with a Hgb of 11.6. He may be iron deficient? (ferritin of 61 and % saturation of iron of 14%). I would have him take an over the counter iron pill. He could take Iron Sulfate 325 mg one po daily. When I see him in 3 mos we can recheck his CBC and iron blood tests again. His glucose is mildly elevataed at 123 and kidney function (serum creatinine of 1.23) is mildly abnormal. FAX to his PCP.   Sawyer vieyra

## 2021-09-21 ENCOUNTER — TELEPHONE (OUTPATIENT)
Dept: GASTROENTEROLOGY | Facility: CLINIC | Age: 76
End: 2021-09-21

## 2021-09-21 NOTE — TELEPHONE ENCOUNTER
----- Message from Valentino Santiago MD sent at 9/19/2021  2:51 PM EDT -----  09/19/21  Tell him that most of his labs looked good. He is mildly anemic with a Hgb of 11.6. He may be iron deficient? (ferritin of 61 and % saturation of iron of 14%). I would have him take an over the counter iron pill. He could take Iron Sulfate 325 mg one po daily. When I see him in 3 mos we can recheck his CBC and iron blood tests again. His glucose is mildly elevataed at 123 and kidney function (serum creatinine of 1.23) is mildly abnormal. FAX to his PCP.   Sawyer vieyra

## 2021-09-21 NOTE — TELEPHONE ENCOUNTER
Called pt and left  for pt to call back.     Dr Santiago's note sent to Dr Novak thru [facility].

## 2021-09-22 NOTE — TELEPHONE ENCOUNTER
Returned call to pt and advised of Dr Santiago's note. Verb understanding and pt reports he already has 3 mo appt made.

## 2022-02-16 ENCOUNTER — OFFICE VISIT (OUTPATIENT)
Dept: GASTROENTEROLOGY | Facility: CLINIC | Age: 77
End: 2022-02-16

## 2022-02-16 ENCOUNTER — TELEPHONE (OUTPATIENT)
Dept: GASTROENTEROLOGY | Facility: CLINIC | Age: 77
End: 2022-02-16

## 2022-02-16 VITALS
SYSTOLIC BLOOD PRESSURE: 172 MMHG | BODY MASS INDEX: 29.59 KG/M2 | WEIGHT: 199.8 LBS | TEMPERATURE: 97.1 F | DIASTOLIC BLOOD PRESSURE: 72 MMHG | HEIGHT: 69 IN | HEART RATE: 81 BPM | OXYGEN SATURATION: 94 %

## 2022-02-16 DIAGNOSIS — K62.5 RECTAL BLEEDING: Primary | ICD-10-CM

## 2022-02-16 DIAGNOSIS — K63.5 POLYP OF COLON, UNSPECIFIED PART OF COLON, UNSPECIFIED TYPE: ICD-10-CM

## 2022-02-16 PROCEDURE — 99214 OFFICE O/P EST MOD 30 MIN: CPT | Performed by: INTERNAL MEDICINE

## 2022-02-16 NOTE — TELEPHONE ENCOUNTER
MANGO rodas for colonoscopy on 04/12/2022  arrive at 11am  . Gave Prep instructions in office. ----miralax      Advised PT  that  will call with final arrival time  24 hrs before procedure. If they do not get a phone call, arrival time will stay the same as given on instructions

## 2022-02-16 NOTE — PROGRESS NOTES
Chief Complaint   Patient presents with   • Hemorrhoids       History of Present Illness:   76 y.o. male who is related to Dr. Elissa Santiago (significant other of Carlee's aunt) w/ h/o DM2 with neuropathy, HTN, s/p left partial nephrectomy from kidney cancer, kidney stones, h/o prostate cancer with XRT(finished in 11/20), h/o colon polyps (last colonoscopy at the [facility] in 6/19, they recommended repeat c/s in 2024), h/o ETOHism, melanoma removed from left cheek (7/19)  who I first saw in 6 of 2021 for a second opinion about the need for repeat hemorrhoid surgery at the [facility].  The patient was uptight about potential complications with the second surgery.  I last saw him in 9 of 21.  My assessment and plan was as follows at that time:       Assessment:  1. Anemia  2. Constipation  3. H/o colon polyps. Last  C/s at the [facility] in 6/19. They recommended a repeat c/s in 24.  4. H/o kidney stones  5. H/o recent UTI.   6.      Recommendations:  1.  CBC, CMP, anemia labs  2. Continue the Miralax  3. F/u 3 mos       Still having a difficult time cleaning his anal area. He has had troubles getting thru our phone system. In the am he sometimes feels that he has to have a BM frequently but sometimes he will sit on the toilet and cannot move his bowels. This may happen 1-2 x/week. He averages 1 BM/day. No constipation. NO diarrhea. Rare rectal bleeding. No melena. Sometimes the hemorrhoids come out. He takes fiber supplement daily.  NO abdomianl or chest pain. NO nausea or ovmiting. NO fevers, chills, night sweats. Weight stable.     Past Medical History:   Diagnosis Date   • Diabetes mellitus (HCC)    • Hypertension    • Kidney stones    • Prostate cancer (HCC) 09/2020       Past Surgical History:   Procedure Laterality Date   • COLONOSCOPY  2020   • KNEE SURGERY  2014   • KNEE SURGERY  2017   • NEPHRECTOMY  1990   • SPINE SURGERY  2014   • SPINE SURGERY  2018         Current Outpatient Medications:   •  albuterol  sulfate  (90 Base) MCG/ACT inhaler, Inhale 2 puffs 4 (Four) Times a Day., Disp: , Rfl:   •  ammonium lactate (AMLACTIN) 12 % cream, Apply  topically to the appropriate area as directed As Needed for Dry Skin., Disp: , Rfl:   •  Aspirin 81 MG capsule, Take 1 capsule by mouth Daily., Disp: , Rfl:   •  atorvastatin (LIPITOR) 20 MG tablet, Take 20 mg by mouth Daily., Disp: , Rfl:   •  bevacizumab (AVASTIN) 100 MG/4ML chemo injection, Inject 1 mg into the eye Every 3 (Three) Months., Disp: , Rfl:   •  clindamycin (CLINDAGEL) 1 % gel, Apply  topically to the appropriate area as directed 2 (Two) Times a Day., Disp: , Rfl:   •  colchicine 0.6 MG tablet, Take 0.6 mg by mouth Daily., Disp: , Rfl:   •  diclofenac sodium (VOTAREN XR) 100 MG 24 hr tablet, Take 100 mg by mouth Daily., Disp: , Rfl:   •  docusate sodium (COLACE) 250 MG capsule, Take 250 mg by mouth Daily., Disp: , Rfl:   •  finasteride (PROSCAR) 5 MG tablet, Take 5 mg by mouth Daily., Disp: , Rfl:   •  gabapentin (NEURONTIN) 400 MG capsule, Take 400 mg by mouth 3 (Three) Times a Day., Disp: , Rfl:   •  glipizide (GLUCOTROL) 10 MG tablet, Take 10 mg by mouth 2 (Two) Times a Day Before Meals., Disp: , Rfl:   •  hydrocortisone 1 % cream, Apply  topically to the appropriate area as directed 2 (Two) Times a Day., Disp: , Rfl:   •  lisinopril (PRINIVIL,ZESTRIL) 2.5 MG tablet, Take 2.5 mg by mouth Daily., Disp: , Rfl:   •  potassium citrate (UROCIT-K) 10 MEQ (1080 MG) CR tablet, Take 10 mEq by mouth 3 (Three) Times a Day With Meals., Disp: , Rfl:   •  tamsulosin (FLOMAX) 0.4 MG capsule 24 hr capsule, Take 1 capsule by mouth Daily., Disp: , Rfl:   •  traMADol (ULTRAM) 50 MG tablet, Take 50 mg by mouth Every 6 (Six) Hours As Needed for Moderate Pain ., Disp: , Rfl:   •  traZODone (DESYREL) 50 MG tablet, Take 50 mg by mouth Every Night., Disp: , Rfl:     No Known Allergies    History reviewed. No pertinent family history.    Social History     Socioeconomic History    • Marital status:    Tobacco Use   • Smoking status: Never Smoker   • Smokeless tobacco: Never Used   Vaping Use   • Vaping Use: Never used   Substance and Sexual Activity   • Alcohol use: Never   • Drug use: Never       Review of Systems   Gastrointestinal: Negative for abdominal pain.   All other systems reviewed and are negative.    Pertinent positives and negatives documented in the HPI and all other systems reviewed and were found to be negative.  Vitals:    02/16/22 0911   BP: 172/72   Pulse: 81   Temp: 97.1 °F (36.2 °C)   SpO2: 94%       Physical Exam  Vitals reviewed.   Constitutional:       General: He is not in acute distress.     Appearance: Normal appearance. He is well-developed. He is not diaphoretic.   HENT:      Head: Normocephalic and atraumatic. Hair is normal.      Right Ear: Hearing, tympanic membrane, ear canal and external ear normal.      Left Ear: Hearing, tympanic membrane, ear canal and external ear normal.      Nose: Nose normal. No nasal deformity.      Mouth/Throat:      Mouth: Mucous membranes are moist. No oral lesions.      Pharynx: Uvula midline. No uvula swelling.   Eyes:      General: Lids are normal. No scleral icterus.        Right eye: No discharge.         Left eye: No discharge.      Extraocular Movements: Extraocular movements intact.      Right eye: Normal extraocular motion and no nystagmus.      Left eye: Normal extraocular motion and no nystagmus.      Conjunctiva/sclera: Conjunctivae normal.      Pupils: Pupils are equal, round, and reactive to light.   Neck:      Thyroid: No thyromegaly.      Vascular: No JVD.   Cardiovascular:      Rate and Rhythm: Normal rate and regular rhythm.      Pulses: Normal pulses.      Heart sounds: Normal heart sounds. No murmur heard.  No gallop.    Pulmonary:      Effort: Pulmonary effort is normal. No respiratory distress.      Breath sounds: Normal breath sounds. No wheezing or rales.   Chest:      Chest wall: No tenderness.    Abdominal:      General: Bowel sounds are normal. There is no distension.      Palpations: Abdomen is soft. There is no mass.      Tenderness: There is no abdominal tenderness. There is no guarding.      Hernia: No hernia is present.   Genitourinary:     Rectum: Guaiac result negative.      Comments: Ext hemorhoids.  Musculoskeletal:         General: No tenderness or deformity. Normal range of motion.      Cervical back: Normal range of motion and neck supple.   Lymphadenopathy:      Cervical: No cervical adenopathy.   Skin:     General: Skin is warm and dry.      Findings: No rash.   Neurological:      Mental Status: He is alert and oriented to person, place, and time.      Cranial Nerves: No cranial nerve deficit.      Motor: No abnormal muscle tone.      Coordination: Coordination normal.      Deep Tendon Reflexes: Reflexes are normal and symmetric. Reflexes normal.   Psychiatric:         Mood and Affect: Mood normal.         Behavior: Behavior normal.         Thought Content: Thought content normal.         Judgment: Judgment normal.         Diagnoses and all orders for this visit:    1. Rectal bleeding (Primary)  -     Case Request; Standing  -     COVID PRE-OP / PRE-PROCEDURE SCREENING ORDER (NO ISOLATION) - Swab, Nasopharynx; Future  -     Case Request    2. Polyp of colon, unspecified part of colon, unspecified type  -     Case Request; Standing  -     COVID PRE-OP / PRE-PROCEDURE SCREENING ORDER (NO ISOLATION) - Swab, Nasopharynx; Future  -     Case Request    Other orders  -     Follow Anesthesia Guidelines / Standing Orders; Future  -     Obtain Informed Consent; Future      Assessment:  1. Iron def anemia?  2. Constipation  3. H/o colon polyps. Last  C/s at the [facility] in 6/19. They recommended a repeat c/s in 24.  4. H/o kidney stones  5. Rectal bleeding  6. Trouble cleaning his anal area. [facility] but [facility] didn't want to.     Recommendations:  1.   2. Get labs from [facility]  3. Stop  iron - based on the [facility] labs  4. Colonoscopy  5. Bring in a list of meds that he is on.     No follow-ups on file.    Valentino Santiago MD  2/16/2022

## 2022-02-21 ENCOUNTER — TELEPHONE (OUTPATIENT)
Dept: GASTROENTEROLOGY | Facility: CLINIC | Age: 77
End: 2022-02-21

## 2022-02-21 NOTE — TELEPHONE ENCOUNTER
2/21/22 - Could you reschedule his colonoscopy to be done in the next 4 weeks? He is c/o that it isn't scheduled till 4/12/22 and would like it sooner.  [email or web address]

## 2022-02-22 NOTE — TELEPHONE ENCOUNTER
MANGO patient via telephone for colonoscopy. Scheduled 03/04/2022 with arrival time 6:15am. Prep packet mailed to verified address on file. Also advised arrival time may change based on [facility] guidelines. MANGO Corcoran-Geovanna

## 2022-02-22 NOTE — TELEPHONE ENCOUNTER
MANGO patient via telephone for colonoscopy. Scheduled 03/04/2022 with arrival time 6:15am. Prep packet mailed to verified address on file. Also advised arrival time may change based on [facility] guidelines. MANGO Corcoran-Geovanna

## 2022-03-04 ENCOUNTER — ANESTHESIA EVENT (OUTPATIENT)
Dept: GASTROENTEROLOGY | Facility: HOSPITAL | Age: 77
End: 2022-03-04

## 2022-03-04 ENCOUNTER — ANESTHESIA (OUTPATIENT)
Dept: GASTROENTEROLOGY | Facility: HOSPITAL | Age: 77
End: 2022-03-04

## 2022-03-04 ENCOUNTER — HOSPITAL ENCOUNTER (OUTPATIENT)
Facility: HOSPITAL | Age: 77
Setting detail: HOSPITAL OUTPATIENT SURGERY
Discharge: HOME | End: 2022-03-04
Attending: INTERNAL MEDICINE | Admitting: INTERNAL MEDICINE

## 2022-03-04 VITALS
TEMPERATURE: 97.8 F | OXYGEN SATURATION: 97 % | WEIGHT: 197.6 LBS | DIASTOLIC BLOOD PRESSURE: 82 MMHG | BODY MASS INDEX: 29.27 KG/M2 | SYSTOLIC BLOOD PRESSURE: 141 MMHG | HEIGHT: 69 IN | RESPIRATION RATE: 16 BRPM | HEART RATE: 75 BPM

## 2022-03-04 LAB — GLUCOSE BLDC GLUCOMTR-MCNC: 125 MG/DL (ref 70–130)

## 2022-03-04 PROCEDURE — 82962 GLUCOSE BLOOD TEST: CPT

## 2022-03-04 PROCEDURE — 25010000002 PROPOFOL 10 MG/ML EMULSION: Performed by: ANESTHESIOLOGY

## 2022-03-04 PROCEDURE — 45378 DIAGNOSTIC COLONOSCOPY: CPT | Performed by: INTERNAL MEDICINE

## 2022-03-04 RX ORDER — SODIUM CHLORIDE 9 MG/ML
30 INJECTION, SOLUTION INTRAVENOUS CONTINUOUS PRN
Status: DISCONTINUED | OUTPATIENT
Start: 2022-03-04 | End: 2022-03-04 | Stop reason: HOSPADM

## 2022-03-04 RX ORDER — SODIUM CHLORIDE, SODIUM LACTATE, POTASSIUM CHLORIDE, CALCIUM CHLORIDE 600; 310; 30; 20 MG/100ML; MG/100ML; MG/100ML; MG/100ML
INJECTION, SOLUTION INTRAVENOUS CONTINUOUS PRN
Status: DISCONTINUED | OUTPATIENT
Start: 2022-03-04 | End: 2022-03-04 | Stop reason: SURG

## 2022-03-04 RX ORDER — LIDOCAINE HYDROCHLORIDE 20 MG/ML
INJECTION, SOLUTION INFILTRATION; PERINEURAL AS NEEDED
Status: DISCONTINUED | OUTPATIENT
Start: 2022-03-04 | End: 2022-03-04 | Stop reason: SURG

## 2022-03-04 RX ORDER — PROPOFOL 10 MG/ML
VIAL (ML) INTRAVENOUS AS NEEDED
Status: DISCONTINUED | OUTPATIENT
Start: 2022-03-04 | End: 2022-03-04 | Stop reason: SURG

## 2022-03-04 RX ADMIN — PROPOFOL 140 MCG/KG/MIN: 10 INJECTION, EMULSION INTRAVENOUS at 07:11

## 2022-03-04 RX ADMIN — SODIUM CHLORIDE, POTASSIUM CHLORIDE, SODIUM LACTATE AND CALCIUM CHLORIDE: 600; 310; 30; 20 INJECTION, SOLUTION INTRAVENOUS at 07:04

## 2022-03-04 RX ADMIN — SODIUM CHLORIDE 30 ML/HR: 9 INJECTION, SOLUTION INTRAVENOUS at 06:52

## 2022-03-04 RX ADMIN — LIDOCAINE HYDROCHLORIDE 60 MG: 20 INJECTION, SOLUTION INFILTRATION; PERINEURAL at 07:09

## 2022-03-04 RX ADMIN — PROPOFOL 50 MG: 10 INJECTION, EMULSION INTRAVENOUS at 07:10

## 2022-03-04 NOTE — ANESTHESIA PREPROCEDURE EVALUATION
Anesthesia Evaluation     Patient summary reviewed and Nursing notes reviewed   NPO Solid Status: > 8 hours  NPO Liquid Status: > 2 hours           Airway   Mallampati: II  TM distance: >3 FB  Neck ROM: full  Dental - normal exam     Pulmonary - negative pulmonary ROS and normal exam   Cardiovascular - normal exam    (+) hypertension 2 medications or greater,       Neuro/Psych- negative ROS  GI/Hepatic/Renal/Endo    (+)  GI bleeding lower , renal disease stones, diabetes mellitus,     Musculoskeletal     (+) back pain, radiculopathy  Abdominal    Substance History - negative use     OB/GYN negative ob/gyn ROS         Other   arthritis,    history of cancer                    Anesthesia Plan    ASA 3     MAC       Anesthetic plan, all risks, benefits, and alternatives have been provided, discussed and informed consent has been obtained with: patient.        CODE STATUS:

## 2022-03-04 NOTE — H&P
Chief Complaint   Patient presents with   • Hemorrhoids         History of Present Illness:   76 y.o. male who is related to Dr. Elissa Santiago (significant other of Carlee's aunt) w/ h/o DM2 with neuropathy, HTN, s/p left partial nephrectomy from kidney cancer, kidney stones, h/o prostate cancer with XRT(finished in 11/20), h/o colon polyps (last colonoscopy at the [facility] in 6/19, they recommended repeat c/s in 2024), h/o ETOHism, melanoma removed from left cheek (7/19)  who I first saw in 6 of 2021 for a second opinion about the need for repeat hemorrhoid surgery at the [facility].  The patient was uptight about potential complications with the second surgery.  I last saw him in 9 of 21.  My assessment and plan was as follows at that time:       Assessment:  1. Anemia  2. Constipation  3. H/o colon polyps. Last  C/s at the [facility] in 6/19. They recommended a repeat c/s in 24.  4. H/o kidney stones  5. H/o recent UTI.   6.      Recommendations:  1.  CBC, CMP, anemia labs  2. Continue the Miralax  3. F/u 3 mos       Still having a difficult time cleaning his anal area. He has had troubles getting thru our phone system. In the am he sometimes feels that he has to have a BM frequently but sometimes he will sit on the toilet and cannot move his bowels. This may happen 1-2 x/week. He averages 1 BM/day. No constipation. NO diarrhea. Rare rectal bleeding. No melena. Sometimes the hemorrhoids come out. He takes fiber supplement daily.  NO abdomianl or chest pain. NO nausea or ovmiting. NO fevers, chills, night sweats. Weight stable.      Medical History        Past Medical History:   Diagnosis Date   • Diabetes mellitus (HCC)     • Hypertension     • Kidney stones     • Prostate cancer (HCC) 09/2020            Surgical History         Past Surgical History:   Procedure Laterality Date   • COLONOSCOPY   2020   • KNEE SURGERY   2014   • KNEE SURGERY   2017   • NEPHRECTOMY   1990   • SPINE SURGERY   2014   • SPINE  SURGERY   2018               Current Outpatient Medications:   •  albuterol sulfate  (90 Base) MCG/ACT inhaler, Inhale 2 puffs 4 (Four) Times a Day., Disp: , Rfl:   •  ammonium lactate (AMLACTIN) 12 % cream, Apply  topically to the appropriate area as directed As Needed for Dry Skin., Disp: , Rfl:   •  Aspirin 81 MG capsule, Take 1 capsule by mouth Daily., Disp: , Rfl:   •  atorvastatin (LIPITOR) 20 MG tablet, Take 20 mg by mouth Daily., Disp: , Rfl:   •  bevacizumab (AVASTIN) 100 MG/4ML chemo injection, Inject 1 mg into the eye Every 3 (Three) Months., Disp: , Rfl:   •  clindamycin (CLINDAGEL) 1 % gel, Apply  topically to the appropriate area as directed 2 (Two) Times a Day., Disp: , Rfl:   •  colchicine 0.6 MG tablet, Take 0.6 mg by mouth Daily., Disp: , Rfl:   •  diclofenac sodium (VOTAREN XR) 100 MG 24 hr tablet, Take 100 mg by mouth Daily., Disp: , Rfl:   •  docusate sodium (COLACE) 250 MG capsule, Take 250 mg by mouth Daily., Disp: , Rfl:   •  finasteride (PROSCAR) 5 MG tablet, Take 5 mg by mouth Daily., Disp: , Rfl:   •  gabapentin (NEURONTIN) 400 MG capsule, Take 400 mg by mouth 3 (Three) Times a Day., Disp: , Rfl:   •  glipizide (GLUCOTROL) 10 MG tablet, Take 10 mg by mouth 2 (Two) Times a Day Before Meals., Disp: , Rfl:   •  hydrocortisone 1 % cream, Apply  topically to the appropriate area as directed 2 (Two) Times a Day., Disp: , Rfl:   •  lisinopril (PRINIVIL,ZESTRIL) 2.5 MG tablet, Take 2.5 mg by mouth Daily., Disp: , Rfl:   •  potassium citrate (UROCIT-K) 10 MEQ (1080 MG) CR tablet, Take 10 mEq by mouth 3 (Three) Times a Day With Meals., Disp: , Rfl:   •  tamsulosin (FLOMAX) 0.4 MG capsule 24 hr capsule, Take 1 capsule by mouth Daily., Disp: , Rfl:   •  traMADol (ULTRAM) 50 MG tablet, Take 50 mg by mouth Every 6 (Six) Hours As Needed for Moderate Pain ., Disp: , Rfl:   •  traZODone (DESYREL) 50 MG tablet, Take 50 mg by mouth Every Night., Disp: , Rfl:      No Known Allergies     History  reviewed. No pertinent family history.     Social History   Social History           Socioeconomic History   • Marital status:    Tobacco Use   • Smoking status: Never Smoker   • Smokeless tobacco: Never Used   Vaping Use   • Vaping Use: Never used   Substance and Sexual Activity   • Alcohol use: Never   • Drug use: Never            Review of Systems   Gastrointestinal: Negative for abdominal pain.   All other systems reviewed and are negative.     Pertinent positives and negatives documented in the HPI and all other systems reviewed and were found to be negative.      Vitals:     02/16/22 0911   BP: 172/72   Pulse: 81   Temp: 97.1 °F (36.2 °C)   SpO2: 94%         Physical Exam  Vitals reviewed.   Constitutional:       General: He is not in acute distress.     Appearance: Normal appearance. He is well-developed. He is not diaphoretic.   HENT:      Head: Normocephalic and atraumatic. Hair is normal.      Right Ear: Hearing, tympanic membrane, ear canal and external ear normal.      Left Ear: Hearing, tympanic membrane, ear canal and external ear normal.      Nose: Nose normal. No nasal deformity.      Mouth/Throat:      Mouth: Mucous membranes are moist. No oral lesions.      Pharynx: Uvula midline. No uvula swelling.   Eyes:      General: Lids are normal. No scleral icterus.        Right eye: No discharge.         Left eye: No discharge.      Extraocular Movements: Extraocular movements intact.      Right eye: Normal extraocular motion and no nystagmus.      Left eye: Normal extraocular motion and no nystagmus.      Conjunctiva/sclera: Conjunctivae normal.      Pupils: Pupils are equal, round, and reactive to light.   Neck:      Thyroid: No thyromegaly.      Vascular: No JVD.   Cardiovascular:      Rate and Rhythm: Normal rate and regular rhythm.      Pulses: Normal pulses.      Heart sounds: Normal heart sounds. No murmur heard.  No gallop.    Pulmonary:      Effort: Pulmonary effort is normal. No  respiratory distress.      Breath sounds: Normal breath sounds. No wheezing or rales.   Chest:      Chest wall: No tenderness.   Abdominal:      General: Bowel sounds are normal. There is no distension.      Palpations: Abdomen is soft. There is no mass.      Tenderness: There is no abdominal tenderness. There is no guarding.      Hernia: No hernia is present.   Genitourinary:     Rectum: Guaiac result negative.      Comments: Ext hemorhoids.  Musculoskeletal:         General: No tenderness or deformity. Normal range of motion.      Cervical back: Normal range of motion and neck supple.   Lymphadenopathy:      Cervical: No cervical adenopathy.   Skin:     General: Skin is warm and dry.      Findings: No rash.   Neurological:      Mental Status: He is alert and oriented to person, place, and time.      Cranial Nerves: No cranial nerve deficit.      Motor: No abnormal muscle tone.      Coordination: Coordination normal.      Deep Tendon Reflexes: Reflexes are normal and symmetric. Reflexes normal.   Psychiatric:         Mood and Affect: Mood normal.         Behavior: Behavior normal.         Thought Content: Thought content normal.         Judgment: Judgment normal.            Diagnoses and all orders for this visit:     1. Rectal bleeding (Primary)  -     Case Request; Standing  -     COVID PRE-OP / PRE-PROCEDURE SCREENING ORDER (NO ISOLATION) - Swab, Nasopharynx; Future  -     Case Request     2. Polyp of colon, unspecified part of colon, unspecified type  -     Case Request; Standing  -     COVID PRE-OP / PRE-PROCEDURE SCREENING ORDER (NO ISOLATION) - Swab, Nasopharynx; Future  -     Case Request     Other orders  -     Follow Anesthesia Guidelines / Standing Orders; Future  -     Obtain Informed Consent; Future        Assessment:  1. Iron def anemia?  2. Constipation  3. H/o colon polyps. Last  C/s at the [facility] in 6/19. They recommended a repeat c/s in 24.  4. H/o kidney stones  5. Rectal bleeding  6. Trouble  cleaning his anal area. [facility] but he didn't want to.      Recommendations:  1.   2. Get labs from [facility]  3. Stop iron - based on the [facility] labs  4. Colonoscopy  5. Bring in a list of meds that he is on.      No follow-ups on file.     3/4/22 - No change from the above H and P.   Valentino Santiago MD

## 2022-03-04 NOTE — ANESTHESIA POSTPROCEDURE EVALUATION
"Patient: Ruben Breen    Procedure Summary     Date: 03/04/22 Room / Location:  [facility] /  [facility]    Anesthesia Start: 0705 Anesthesia Stop: 0732    Procedure: COLONOSCOPY into cecum and terminal ileum (N/A ) Diagnosis:       Rectal bleeding      Polyp of colon, unspecified part of colon, unspecified type      (Rectal bleeding [K62.5])      (Polyp of colon, unspecified part of colon, unspecified type [K63.5])    Surgeons: Valentino Santiago MD Provider: Rich Eller MD    Anesthesia Type: MAC ASA Status: 3          Anesthesia Type: MAC    Vitals  Vitals Value Taken Time   /65 03/04/22 0729   Temp     Pulse 70 03/04/22 0729   Resp 16 03/04/22 0729   SpO2 99 % 03/04/22 0729           Post Anesthesia Care and Evaluation    Patient location during evaluation: bedside  Patient participation: complete - patient participated  Level of consciousness: awake  Pain score: 2  Pain management: adequate  Airway patency: patent  Anesthetic complications: No anesthetic complications  PONV Status: none  Cardiovascular status: acceptable  Respiratory status: acceptable  Hydration status: acceptable    Comments: /65 (BP Location: Left arm, Patient Position: Lying)   Pulse 70   Temp 36.6 °C (97.8 °F) (Oral)   Resp 16   Ht 175.3 cm (69\")   Wt 89.6 kg (197 lb 9.6 oz)   SpO2 99%   BMI 29.18 kg/m²         "

## 2022-05-06 ENCOUNTER — OFFICE VISIT (OUTPATIENT)
Dept: SURGERY | Facility: CLINIC | Age: 77
End: 2022-05-06

## 2022-05-06 VITALS
HEART RATE: 87 BPM | DIASTOLIC BLOOD PRESSURE: 72 MMHG | SYSTOLIC BLOOD PRESSURE: 146 MMHG | HEIGHT: 69 IN | OXYGEN SATURATION: 95 % | BODY MASS INDEX: 29.19 KG/M2 | WEIGHT: 197.1 LBS | TEMPERATURE: 97.4 F

## 2022-05-06 DIAGNOSIS — K64.4 EXTERNAL HEMORRHOID: ICD-10-CM

## 2022-05-06 DIAGNOSIS — K64.8 INTERNAL HEMORRHOIDS WITH COMPLICATION: Primary | ICD-10-CM

## 2022-05-06 PROBLEM — M48.061 SPINAL STENOSIS OF LUMBAR REGION: Status: ACTIVE | Noted: 2022-05-06

## 2022-05-06 PROBLEM — C61 CARCINOMA OF PROSTATE (HCC): Status: ACTIVE | Noted: 2022-05-06

## 2022-05-06 PROBLEM — M51.36 DEGENERATION OF INTERVERTEBRAL DISC OF LUMBAR REGION: Status: ACTIVE | Noted: 2022-05-06

## 2022-05-06 PROBLEM — Z87.442 HISTORY OF RENAL CALCULI: Status: ACTIVE | Noted: 2022-05-06

## 2022-05-06 PROBLEM — N40.0 HYPERPLASIA OF PROSTATE: Status: ACTIVE | Noted: 2022-05-06

## 2022-05-06 PROBLEM — Z85.828 PERSONAL HISTORY OF OTHER MALIGNANT NEOPLASM OF SKIN: Status: ACTIVE | Noted: 2022-05-06

## 2022-05-06 PROBLEM — R91.1 SOLITARY PULMONARY NODULE: Status: ACTIVE | Noted: 2022-05-06

## 2022-05-06 PROBLEM — G56.00 CARPAL TUNNEL SYNDROME: Status: ACTIVE | Noted: 2022-05-06

## 2022-05-06 PROBLEM — C64.9 MALIGNANT NEOPLASM OF KIDNEY EXCLUDING RENAL PELVIS (HCC): Status: ACTIVE | Noted: 2022-05-06

## 2022-05-06 PROBLEM — R26.89 OTHER ABNORMALITIES OF GAIT AND MOBILITY: Status: ACTIVE | Noted: 2022-05-06

## 2022-05-06 PROBLEM — M17.9 OSTEOARTHRITIS OF KNEE: Status: ACTIVE | Noted: 2018-02-02

## 2022-05-06 PROBLEM — R31.9 HEMATURIA: Status: ACTIVE | Noted: 2022-05-06

## 2022-05-06 PROBLEM — M51.17 INTERVERTEBRAL DISC DISORDERS WITH RADICULOPATHY, LUMBOSACRAL REGION: Status: ACTIVE | Noted: 2022-05-06

## 2022-05-06 PROBLEM — F10.21 HISTORY OF ALCOHOLISM: Status: ACTIVE | Noted: 2022-05-06

## 2022-05-06 PROBLEM — H40.013 OPEN ANGLE WITH BORDERLINE FINDINGS, LOW RISK, BILATERAL: Status: ACTIVE | Noted: 2022-05-06

## 2022-05-06 PROBLEM — K64.9 HEMORRHOID: Status: ACTIVE | Noted: 2022-05-06

## 2022-05-06 PROBLEM — E11.40 DIABETIC NEUROPATHY (HCC): Status: ACTIVE | Noted: 2022-05-06

## 2022-05-06 PROBLEM — L82.1 OTHER SEBORRHEIC KERATOSIS: Status: ACTIVE | Noted: 2022-05-06

## 2022-05-06 PROBLEM — G60.3 IDIOPATHIC PROGRESSIVE NEUROPATHY: Status: ACTIVE | Noted: 2022-05-06

## 2022-05-06 PROBLEM — H35.021 EXUDATIVE RETINOPATHY, RIGHT EYE: Status: ACTIVE | Noted: 2022-05-06

## 2022-05-06 PROBLEM — Z23 ENCOUNTER FOR IMMUNIZATION: Status: ACTIVE | Noted: 2022-05-06

## 2022-05-06 PROBLEM — N20.0 CALCULUS OF KIDNEY: Status: ACTIVE | Noted: 2022-05-06

## 2022-05-06 PROBLEM — E78.5 OTHER AND UNSPECIFIED HYPERLIPIDEMIA: Status: ACTIVE | Noted: 2022-05-06

## 2022-05-06 PROBLEM — H90.3 SENSORINEURAL HEARING LOSS, BILATERAL: Status: ACTIVE | Noted: 2022-05-06

## 2022-05-06 PROBLEM — N39.0 URINARY TRACT INFECTION, SITE NOT SPECIFIED: Status: ACTIVE | Noted: 2022-05-06

## 2022-05-06 PROBLEM — L98.8 OTHER SPECIFIED DERMATOSES: Status: ACTIVE | Noted: 2022-05-06

## 2022-05-06 PROBLEM — H54.511A LOW VISION RIGHT EYE CATEGORY 1, NORMAL VISION LEFT EYE: Status: ACTIVE | Noted: 2022-05-06

## 2022-05-06 PROBLEM — E11.9 TYPE 2 DIABETES MELLITUS WITHOUT COMPLICATIONS: Status: ACTIVE | Noted: 2022-05-06

## 2022-05-06 PROBLEM — C61 MALIGNANT NEOPLASM OF PROSTATE (HCC): Status: ACTIVE | Noted: 2022-05-06

## 2022-05-06 PROBLEM — Z96.659 KNEE JOINT REPLACED BY OTHER MEANS: Status: ACTIVE | Noted: 2018-05-02

## 2022-05-06 PROBLEM — G25.81 RESTLESS LEGS: Status: ACTIVE | Noted: 2022-05-06

## 2022-05-06 PROBLEM — C43.30: Status: ACTIVE | Noted: 2022-05-06

## 2022-05-06 PROBLEM — K64.2 THIRD DEGREE HEMORRHOIDS: Status: ACTIVE | Noted: 2022-05-06

## 2022-05-06 PROBLEM — Z86.010 HISTORY OF COLONIC POLYPS: Status: ACTIVE | Noted: 2022-05-06

## 2022-05-06 PROBLEM — Z23 NEED FOR PROPHYLACTIC VACCINATION AND INOCULATION AGAINST VIRAL DISEASE: Status: ACTIVE | Noted: 2022-05-06

## 2022-05-06 PROBLEM — M65.30 ACQUIRED TRIGGER FINGER: Status: ACTIVE | Noted: 2022-05-06

## 2022-05-06 PROCEDURE — 99204 OFFICE O/P NEW MOD 45 MIN: CPT | Performed by: COLON & RECTAL SURGERY

## 2022-05-06 PROCEDURE — 46600 DIAGNOSTIC ANOSCOPY SPX: CPT | Performed by: COLON & RECTAL SURGERY

## 2022-05-06 RX ORDER — AMOXICILLIN 500 MG/1
1000 CAPSULE ORAL DAILY PRN
COMMUNITY
End: 2022-06-09

## 2022-05-06 RX ORDER — SODIUM CHLORIDE 0.9 % (FLUSH) 0.9 %
3 SYRINGE (ML) INJECTION EVERY 12 HOURS SCHEDULED
Status: CANCELLED | OUTPATIENT
Start: 2022-05-24

## 2022-05-06 RX ORDER — POLYETHYLENE GLYCOL 3350 17 G/17G
17 POWDER, FOR SOLUTION ORAL DAILY
COMMUNITY
End: 2022-05-19

## 2022-05-06 RX ORDER — SODIUM CHLORIDE 0.9 % (FLUSH) 0.9 %
3-10 SYRINGE (ML) INJECTION AS NEEDED
Status: CANCELLED | OUTPATIENT
Start: 2022-05-24

## 2022-05-06 RX ORDER — UREA 200 MG/G
1 GEL TOPICAL AS NEEDED
COMMUNITY

## 2022-05-06 RX ORDER — CEFAZOLIN SODIUM 2 G/100ML
2 INJECTION, SOLUTION INTRAVENOUS ONCE
Status: CANCELLED | OUTPATIENT
Start: 2022-05-24 | End: 2022-05-06

## 2022-05-06 RX ORDER — METRONIDAZOLE 500 MG/100ML
500 INJECTION, SOLUTION INTRAVENOUS ONCE
Status: CANCELLED | OUTPATIENT
Start: 2022-05-24 | End: 2022-05-06

## 2022-05-06 NOTE — H&P (VIEW-ONLY)
Ruben Breen is a 76 y.o. male who is seen for Rectal Bleeding.      HPI:  The patient had a colonoscopy done on 03/04/2022 by Dr. Santiago which showed diverticulosis and hemorrhoids. He has a history of polyps, and is having trouble with constipation and hemorrhoids. He reports he started having problems cleaning himself after a bowel movement, and it seems like it is progressively getting worse. The patient reports that occasionally he suffers with external hemorrhoids. He states his bowel movements are not as regular and solid as they use to be. According to the [facility] stool chart, he notes his bowel movements are between type 3 and 4, occasionally type 6.     The patient started taking MiraLax 6 years ago, upon a [facility], and is currently taking it on a daily basis. He does not take a fiber supplement like Metamucil, although it has been suggested to him; he has concerns about how it will react with other medications he is taking. He recalls taking Metamucil years ago, noting that it helped keep him regular. He notes being a very conscientious carol ann, hygiene wise, and the frequent bowel movements are starting to cause disruption to his social life.    The patient mentions b frequent runner in the past completing a couple marathons a year, but since having surgery on his knees, and the six back surgeries, he can no longer run and is somewhat limited on physical activities. He also went through radiation therapy for prostate cancer. He currently receives a hormone shot every 6 months. His PSA is down to 1.0 ng/mL. He still does exercise and he still walks a couple miles everyday.    He has a history of diabetes.    Past Medical History:   Diagnosis Date   • Anemia    • Cataract    • Chronic low back pain    • Colon polyps     FOLLOWED BY DR. 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 OTILIA AVILES   • Pseudoarthrosis of lumbar spine    • Rectal bleeding 02/2022   • RLS (restless legs syndrome)    • Sensorineural hearing loss (SNHL) of both ears     WEARS HEARING AIDS   • Snoring    • Thoracic spinal stenosis    • Venous insufficiency        Past Surgical History:   Procedure Laterality Date   • BONE RESECTION, RIB Left 05/03/2004    [facility] RIBS [facility]   • CARPAL TUNNEL RELEASE Right 04/24/2005    [facility]   • CATARACT EXTRACTION EXTRACAPSULAR W/ INTRAOCULAR LENS IMPLANTATION Right 10/04/2018    [facility]   • COLONOSCOPY N/A 06/03/2019    2 TUBULAR ADENOMA POLYPS IN ASCENDING, SEVERE DIVERTICULOSIS, RESCOPE IN 5 YRS, DR[facility] LENA MANN [facility]   • COLONOSCOPY N/A 03/04/2022    HEMORRHOIDS, DIVERTICULOSIS IN SIGMOID,EROSIONS ON INTERNAL HEMORRHOIDS, DR. TIFFANIE LIND AT [facility]   • COLONOSCOPY N/A 11/30/2018    MULTIPLE DIVERTICULA IN LEFT COLON,7 MM POLYP IN RECTUM, DR[facility] MING OLIVEROS [facility]   • COLONOSCOPY N/A 08/2005    TUBULOVILLOUS ADENOMA POLYP, RESCOPE IN 3 YRS   • COLONOSCOPY N/A 03/2005    BENIGN POLYP, RESCOPE IN 5 YRS   • COLONOSCOPY N/A 03/2013    TUBULAR ADENOMA, RESCOPE IN 3YRS   • COLONOSCOPY N/A 12/2018    BENIGN POLYP, POOR PREP RESCOPE IN 1YR   • EXCISION LESION N/A 01/03/2020    LESION X3, DR[facility] JONATHAN KENNEDY [facility]   • EXCISION LESION N/A 09/25/2019    DESTRUCTION OF LESION, DR. JONATHAN KENNEDY [facility]   • EXTRACORPOREAL SHOCK WAVE LITHOTRIPSY (ESWL)  Right 05/19/2016    AT [facility]   • HEMORRHOID BANDING N/A 02/16/2017    DR. GROVE [facility]   • HEMORRHOID BANDING N/A 2020    [facility]   • LUMBAR DECOMPRESSION N/A 06/22/2015    POSTERIOR LUMBAR DECOMPRESSION AND FUSION L2-3 WITH LEGACY INSTRUMENTATION AND CELLSAVER, DR. LIZA KIM AT [place]   • LUMBAR FUSION N/A 04/11/2017    REMOVAL OF INSTRUMENTATION L2-4, POSTERIOR FUSION L2-4, DR. LIZA KIM AT [place]   • LUMBAR LAMINECTOMY DISCECTOMY DECOMPRESSION N/A 06/27/2014    POSTERIOR DECOMPRESSION AND FUSION L3-S1, LAMINECTOMY L3-S1, RIGHT L4-5 DISCECTOMY WITH LEGACY INSTRUMENTATION AND CELLS SAVER, DR. LIZA KIM AT [place]   • NEPHRECTOMY Left 08/25/1999    PERCUTANEOUS NEPHRECTOMY, [facility]   • NEPHRECTOMY RADICAL Left 05/03/2004    D/T KIDNEY CANCER, WITH REMOVAL OF RIBS, AT [facility]   • POSTERIOR LUMBAR/THORACIC SPINE FUSION N/A 08/17/2021    HARDWARE REMOVAL L1-S1, POSTERIOR FUSION L3-4 WITH AUTOGRAFT, DR. LIZA KIM AT [place]   • POSTERIOR LUMBAR/THORACIC SPINE FUSION N/A 08/23/2016    POSTERIOR LUMBAR FUSION T10-L3, DECOMPRESSION L1-2, HARDWARE REMOVAL L2, DR. LIZA KIM AT [place]   • PROSTATE ULTRASOUND BIOPSY Bilateral 05/07/2020    VALERI 9 ADENOCARCINOMA BILATERAL, AT [facility]   • SKIN CANCER DESTRUCTION Left 07/25/2019    MELANOMA LEFT CHEEK, DR. KARTHIK LI [facility]   • SKIN CANCER EXCISION Left 02/17/2012    LEFT EAR WITH LOBE REMOVAL, AT [facility]   • SKIN CANCER EXCISION Left 06/25/2019    MELANOMA LEFT CHEEK   • THORACIC DECOMPRESSION POSTERIOR FUSION N/A 07/31/2017    EXTENSION OF POSTERIOR FUSION T6-10, DECOMPRESSION T9-10, DR. EMMANUEL BARRIENTOS AT [place]   • TOTAL KNEE ARTHROPLASTY Right 05/17/2011   • TOTAL KNEE ARTHROPLASTY Left 03/05/2018    DR. ROBERTO PIÑA AT [place]   • TOTAL KNEE ARTHROPLASTY REVISION Right 05/02/2018    DR. ROBERTO PIÑA AT [place]   • TOTAL KNEE ARTHROPLASTY REVISION Left    • TRIGGER FINGER RELEASE Right 02/09/2005    [facility]       Social History:   reports that he has  never smoked. He has never used smokeless tobacco. He reports that he does not drink alcohol and does not use drugs.      Marriage status:     Family History   Problem Relation Age of Onset   • Alcohol abuse Mother    • Alcohol abuse Father    • Hypertension Maternal Grandfather    • Arthritis Paternal Grandmother    • Hypertension Paternal Grandfather    • Malig Hyperthermia Neg Hx        Current Outpatient Medications:   •  ammonium lactate (AMLACTIN) 12 % cream, Apply  topically to the appropriate area as directed As Needed for Dry Skin., Disp: , Rfl:   •  amoxicillin (AMOXIL) 500 MG capsule, Take 1,000 mg by mouth Daily As Needed (used prior to dental visits)., Disp: , Rfl:   •  atorvastatin (LIPITOR) 20 MG tablet, Take 20 mg by mouth Daily., Disp: , Rfl:   •  clindamycin (CLINDAGEL) 1 % gel, Apply  topically to the appropriate area as directed 2 (Two) Times a Day., Disp: , Rfl:   •  colchicine 0.6 MG tablet, Take 0.6 mg by mouth Daily., Disp: , Rfl:   •  diclofenac sodium (VOTAREN XR) 100 MG 24 hr tablet, Take 100 mg by mouth Daily., Disp: , Rfl:   •  docusate sodium (COLACE) 250 MG capsule, Take 250 mg by mouth Daily., Disp: , Rfl:   •  finasteride (PROSCAR) 5 MG tablet, Take 5 mg by mouth Daily., Disp: , Rfl:   •  gabapentin (NEURONTIN) 400 MG capsule, Take 400 mg by mouth Daily As Needed., Disp: , Rfl:   •  glipizide (GLUCOTROL) 10 MG tablet, Take 10 mg by mouth 2 (Two) Times a Day Before Meals., Disp: , Rfl:   •  hydrocortisone 1 % cream, Apply  topically to the appropriate area as directed 2 (Two) Times a Day., Disp: , Rfl:   •  lisinopril (PRINIVIL,ZESTRIL) 2.5 MG tablet, Take 2.5 mg by mouth Daily., Disp: , Rfl:   •  polyethylene glycol (MIRALAX) 17 g packet, Take 17 g by mouth Daily., Disp: , Rfl:   •  potassium citrate (UROCIT-K) 10 MEQ (1080 MG) CR tablet, Take 10 mEq by mouth Daily As Needed., Disp: , Rfl:   •  tamsulosin (FLOMAX) 0.4 MG capsule 24 hr capsule, Take 1 capsule by mouth Daily.,  Disp: , Rfl:   •  traMADol (ULTRAM) 50 MG tablet, Take 50 mg by mouth Every 6 (Six) Hours As Needed for Moderate Pain ., Disp: , Rfl:   •  traZODone (DESYREL) 50 MG tablet, Take 50 mg by mouth Every Night., Disp: , Rfl:   •  urea (CARMOL) 20 % cream, Apply 1 application topically to the appropriate area as directed As Needed for Dry Skin., Disp: , Rfl:   •  albuterol sulfate  (90 Base) MCG/ACT inhaler, Inhale 2 puffs 4 (Four) Times a Day., Disp: , Rfl:   •  Aspirin 81 MG capsule, Take 1 capsule by mouth Daily., Disp: , Rfl:   •  bevacizumab (AVASTIN) 100 MG/4ML chemo injection, Inject 1 mg into the eye Every 3 (Three) Months., Disp: , Rfl:     Allergy  Patient has no known allergies.    Review of Systems   Constitutional: Positive for malaise/fatigue. Negative for decreased appetite and weight gain.   HENT: Positive for hearing loss. Negative for congestion and hoarse voice.    Eyes: Negative for blurred vision, discharge and visual disturbance.   Cardiovascular: Negative for chest pain, cyanosis and leg swelling.   Respiratory: Negative for cough, shortness of breath, sleep disturbances due to breathing and snoring.    Endocrine: Negative for cold intolerance and heat intolerance.   Hematologic/Lymphatic: Does not bruise/bleed easily.   Skin: Negative for itching, poor wound healing and skin cancer.   Musculoskeletal: Positive for back pain. Negative for arthritis, joint pain and joint swelling.   Gastrointestinal: Positive for hematochezia. Negative for abdominal pain, change in bowel habit, bowel incontinence and constipation.   Genitourinary: Negative for bladder incontinence, dysuria and hematuria.   Neurological: Positive for disturbances in coordination, numbness and weakness. Negative for brief paralysis, excessive daytime sleepiness, dizziness, focal weakness, headaches and light-headedness.   Psychiatric/Behavioral: Negative for altered mental status and hallucinations. The patient does not have  insomnia.    Allergic/Immunologic: Negative for HIV exposure and persistent infections.   All other systems reviewed and are negative.      Vitals:    05/06/22 0912   BP: 146/72   Pulse: 87   Temp: 97.4 °F (36.3 °C)   SpO2: 95%     Body mass index is 29.11 kg/m².    Physical Exam  Exam conducted with a chaperone present.   Constitutional:       General: He is not in acute distress.     Appearance: He is well-developed.   HENT:      Head: Normocephalic and atraumatic.      Nose: Nose normal.   Eyes:      Conjunctiva/sclera: Conjunctivae normal.      Pupils: Pupils are equal, round, and reactive to light.   Neck:      Trachea: No tracheal deviation.   Pulmonary:      Effort: Pulmonary effort is normal. No respiratory distress.      Breath sounds: Normal breath sounds.   Abdominal:      General: Bowel sounds are normal. There is no distension.      Palpations: Abdomen is soft.   Genitourinary:     Comments: Perianal exam: external hem -enlarged  JOYCELYN-good tone, no masses  Anoscopy performed:  Grade 3 x 3 internal hem    Musculoskeletal:         General: No deformity. Normal range of motion.      Cervical back: Normal range of motion.   Skin:     General: Skin is warm and dry.   Neurological:      Mental Status: He is alert and oriented to person, place, and time.      Cranial Nerves: No cranial nerve deficit.      Coordination: Coordination normal.      Gait: Gait normal.   Psychiatric:         Behavior: Behavior normal.         Judgment: Judgment normal.         Review of Medical Record:  I reviewed colonoscopy report and pictures    Assessment:  1. Internal hemorrhoids with complication    2. External hemorrhoid        Plan:  I discussed with the patient that upon examination, he has one or two enlarged external hemorrhoids, and when looking with the anoscope, I see three bigger internal hemorrhoids. I feel as far as the tissue is concerned, the rubber band procedure, nor the laser, are going to be sufficient to get  the result that we want. We discussed the options of the traditional hemorrhoidectomy, where you take the tissue off and the blood vessel behind it and then suture it back,  or we treat them with a hydrocortisone creams to see if they can be reduced. I would recommend we do the hemorrhoidectomy. I think that would make it easier for him and then once we get that extra tissue out of the way, he really focus on the fiber and I think he will have a good result. Both procedures were discussed with him at length, as well as the risks and benefits of each procedure. The patient had the opportunity to ask questions and I answered all questions. The patient understands and wishes to proceed with the hemorrhoidectomy.  The patient will proceed with the hemorrhoidectomy. The prep, as well as the risks and benefits, and postop recovery for this procedure have been discussed.         Transcribed from ambient dictation for Yang Sullivan MD by Linnea Lasron.  05/06/22   13:32 EDT    Patient verbalized consent to the visit recording.     This patient was evaluated by me, recommendations made, documentation reviewed, edited, and revised by me, Yang Sullivan MD    ]

## 2022-05-19 ENCOUNTER — PRE-ADMISSION TESTING (OUTPATIENT)
Dept: PREADMISSION TESTING | Facility: HOSPITAL | Age: 77
End: 2022-05-19

## 2022-05-19 VITALS
HEART RATE: 75 BPM | BODY MASS INDEX: 29.77 KG/M2 | HEIGHT: 69 IN | RESPIRATION RATE: 20 BRPM | OXYGEN SATURATION: 95 % | WEIGHT: 201 LBS | SYSTOLIC BLOOD PRESSURE: 152 MMHG | DIASTOLIC BLOOD PRESSURE: 77 MMHG | TEMPERATURE: 97 F

## 2022-05-19 LAB
ANION GAP SERPL CALCULATED.3IONS-SCNC: 7 MMOL/L (ref 5–15)
BUN SERPL-MCNC: 20 MG/DL (ref 8–23)
BUN/CREAT SERPL: 17.4 (ref 7–25)
CALCIUM SPEC-SCNC: 10.3 MG/DL (ref 8.6–10.5)
CHLORIDE SERPL-SCNC: 104 MMOL/L (ref 98–107)
CO2 SERPL-SCNC: 28 MMOL/L (ref 22–29)
CREAT SERPL-MCNC: 1.15 MG/DL (ref 0.76–1.27)
DEPRECATED RDW RBC AUTO: 43.9 FL (ref 37–54)
EGFRCR SERPLBLD CKD-EPI 2021: 65.1 ML/MIN/1.73
ERYTHROCYTE [DISTWIDTH] IN BLOOD BY AUTOMATED COUNT: 12.9 % (ref 12.3–15.4)
GLUCOSE SERPL-MCNC: 90 MG/DL (ref 65–99)
HCT VFR BLD AUTO: 37.9 % (ref 37.5–51)
HGB BLD-MCNC: 12.6 G/DL (ref 13–17.7)
MCH RBC QN AUTO: 31.3 PG (ref 26.6–33)
MCHC RBC AUTO-ENTMCNC: 33.2 G/DL (ref 31.5–35.7)
MCV RBC AUTO: 94.3 FL (ref 79–97)
PLATELET # BLD AUTO: 202 10*3/MM3 (ref 140–450)
PMV BLD AUTO: 10.1 FL (ref 6–12)
POTASSIUM SERPL-SCNC: 4.7 MMOL/L (ref 3.5–5.2)
QT INTERVAL: 404 MS
RBC # BLD AUTO: 4.02 10*6/MM3 (ref 4.14–5.8)
SODIUM SERPL-SCNC: 139 MMOL/L (ref 136–145)
WBC NRBC COR # BLD: 5.36 10*3/MM3 (ref 3.4–10.8)

## 2022-05-19 PROCEDURE — 93010 ELECTROCARDIOGRAM REPORT: CPT | Performed by: INTERNAL MEDICINE

## 2022-05-19 PROCEDURE — 93005 ELECTROCARDIOGRAM TRACING: CPT

## 2022-05-19 PROCEDURE — 80048 BASIC METABOLIC PNL TOTAL CA: CPT

## 2022-05-19 PROCEDURE — 85027 COMPLETE CBC AUTOMATED: CPT

## 2022-05-19 PROCEDURE — 36415 COLL VENOUS BLD VENIPUNCTURE: CPT

## 2022-05-19 NOTE — DISCHARGE INSTRUCTIONS
DO NOT take the following medications the morning of surgery:  LISINOPRIL, GLIPIZIDE    ARRIVAL TIME FOR SURGERY IS 11:30 AM      If you are on prescription narcotic pain medication to control your pain you may also take that medication the morning of surgery.    General Instructions:  Do not eat solid food after midnight the night before surgery.  You may drink clear liquids day of surgery but must stop at least one hour before your hospital arrival time.  It is beneficial for you to have a clear drink that contains carbohydrates the day of surgery.  We suggest a 12 to 20 ounce bottle of Gatorade or Powerade for non-diabetic patients or a 12 to 20 ounce bottle of G2 or Powerade Zero for diabetic patients. (Pediatric patients, are not advised to drink a 12 to 20 ounce carbohydrate drink)  PERFORM BOWL PREP/ENEMA ACCORDING TO MD INSTRUCTIONS    Clear liquids are liquids you can see through.  Nothing red in color.     Plain water                               Sports drinks  Sodas                                   Gelatin (Jell-O)  Fruit juices without pulp such as white grape juice and apple juice  Popsicles that contain no fruit or yogurt  Tea or coffee (no cream or milk added)  Gatorade / Powerade  G2 / Powerade Zero    Infants may have breast milk up to four hours before surgery.  Infants drinking formula may drink formula up to six hours before surgery.   Patients who avoid smoking, chewing tobacco and alcohol for 4 weeks prior to surgery have a reduced risk of post-operative complications.  Quit smoking as many days before surgery as you can.  Do not smoke, use chewing tobacco or drink alcohol the day of surgery.   If applicable bring your C-PAP/ BI-PAP machine.  Bring any papers given to you in the doctor’s office.  Wear clean comfortable clothes.  Do not wear contact lenses, false eyelashes or make-up.  Bring a case for your glasses.   Bring crutches or walker if applicable.  Remove all piercings.  Leave  jewelry and any other valuables at home.  Hair extensions with metal clips must be removed prior to surgery.  The Pre-Admission Testing nurse will instruct you to bring medications if unable to obtain an accurate list in Pre-Admission Testing.        If you were given a blood bank ID arm band remember to bring it with you the day of surgery.    Preventing a Surgical Site Infection:  For 2 to 3 days before surgery, avoid shaving with a razor because the razor can irritate skin and make it easier to develop an infection.    Any areas of open skin can increase the risk of a post-operative wound infection by allowing bacteria to enter and travel throughout the body.  Notify your surgeon if you have any skin wounds / rashes even if it is not near the expected surgical site.  The area will need assessed to determine if surgery should be delayed until it is healed.  The night prior to surgery shower using a fresh bar of anti-bacterial soap (such as Dial) and clean washcloth.  Sleep in a clean bed with clean clothing.  Do not allow pets to sleep with you.  Shower on the morning of surgery using a fresh bar of anti-bacterial soap (such as Dial) and clean washcloth.  Dry with a clean towel and dress in clean clothing.  Ask your surgeon if you will be receiving antibiotics prior to surgery.  Make sure you, your family, and all healthcare providers clean their hands with soap and water or an alcohol based hand  before caring for you or your wound.    Day of surgery:  Your arrival time is approximately two hours before your scheduled surgery time.  Upon arrival, a Pre-op nurse and Anesthesiologist will review your health history, obtain vital signs, and answer questions you may have.  The only belongings needed at this time will be a list of your home medications and if applicable your C-PAP/BI-PAP machine.  A Pre-op nurse will start an IV and you may receive medication in preparation for surgery, including something to  help you relax.     Please be aware that surgery does come with discomfort.  We want to make every effort to control your discomfort so please discuss any uncontrolled symptoms with your nurse.   Your doctor will most likely have prescribed pain medications.      If you are going home after surgery you will receive individualized written care instructions before being discharged.  A responsible adult must drive you to and from the hospital on the day of your surgery and stay with you for 24 hours.  Discharge prescriptions can be filled by the hospital pharmacy during regular pharmacy hours.  If you are having surgery late in the day/evening your prescription may be e-prescribed to your pharmacy.  Please verify your pharmacy hours or chose a 24 hour pharmacy to avoid not having access to your prescription because your pharmacy has closed for the day.    If you are staying overnight following surgery, you will be transported to your hospital room following the recovery period.  [facility] has all private rooms.    If you have any questions please call Pre-Admission Testing at [phone].  Deductibles and co-payments are collected on the day of service. Please be prepared to pay the required co-pay, deductible or deposit on the day of service as defined by your plan.    Patient Education for Self-Quarantine Process    Following your COVID testing, we strongly recommend that you wear a mask when you are with other people and practice social distancing.   Limit your activities to only required outings.  Wash your hands with soap and water frequently for at least 20 seconds.   Avoid touching your eyes, nose and mouth with unwashed hands.  Do not share anything - utensils, drinking glasses, food from the same bowl.   Sanitize household surfaces daily. Include all high touch areas (door handles, light switches, phones, countertops, etc.)    Call your surgeon immediately if you experience any of the  following symptoms:  Sore Throat  Shortness of Breath or difficulty breathing  Cough  Chills  Body soreness or muscle pain  Headache  Fever  New loss of taste or smell  Do not arrive for your surgery ill.  Your procedure will need to be rescheduled to another time.  You will need to call your physician before the day of surgery to avoid any unnecessary exposure to hospital staff as well as other patients.

## 2022-05-21 ENCOUNTER — LAB (OUTPATIENT)
Dept: LAB | Facility: HOSPITAL | Age: 77
End: 2022-05-21

## 2022-05-21 DIAGNOSIS — K64.4 EXTERNAL HEMORRHOID: ICD-10-CM

## 2022-05-21 DIAGNOSIS — K64.8 INTERNAL HEMORRHOIDS WITH COMPLICATION: ICD-10-CM

## 2022-05-21 LAB — SARS-COV-2 ORF1AB RESP QL NAA+PROBE: NOT DETECTED

## 2022-05-21 PROCEDURE — U0004 COV-19 TEST NON-CDC HGH THRU: HCPCS

## 2022-05-21 PROCEDURE — U0005 INFEC AGEN DETEC AMPLI PROBE: HCPCS

## 2022-05-21 PROCEDURE — C9803 HOPD COVID-19 SPEC COLLECT: HCPCS

## 2022-05-23 ENCOUNTER — HOSPITAL ENCOUNTER (OUTPATIENT)
Facility: HOSPITAL | Age: 77
Setting detail: HOSPITAL OUTPATIENT SURGERY
Discharge: HOME | End: 2022-05-23
Attending: COLON & RECTAL SURGERY | Admitting: COLON & RECTAL SURGERY

## 2022-05-23 ENCOUNTER — ANESTHESIA (OUTPATIENT)
Dept: PERIOP | Facility: HOSPITAL | Age: 77
End: 2022-05-23

## 2022-05-23 ENCOUNTER — ANESTHESIA EVENT (OUTPATIENT)
Dept: PERIOP | Facility: HOSPITAL | Age: 77
End: 2022-05-23

## 2022-05-23 VITALS
TEMPERATURE: 97.6 F | OXYGEN SATURATION: 94 % | HEART RATE: 96 BPM | RESPIRATION RATE: 16 BRPM | DIASTOLIC BLOOD PRESSURE: 88 MMHG | SYSTOLIC BLOOD PRESSURE: 150 MMHG

## 2022-05-23 DIAGNOSIS — K64.8 INTERNAL HEMORRHOIDS WITH COMPLICATION: ICD-10-CM

## 2022-05-23 DIAGNOSIS — K64.4 EXTERNAL HEMORRHOID: ICD-10-CM

## 2022-05-23 LAB
GLUCOSE BLDC GLUCOMTR-MCNC: 133 MG/DL (ref 70–130)
GLUCOSE BLDC GLUCOMTR-MCNC: 94 MG/DL (ref 70–130)

## 2022-05-23 PROCEDURE — 25010000002 DEXAMETHASONE PER 1 MG

## 2022-05-23 PROCEDURE — 82962 GLUCOSE BLOOD TEST: CPT

## 2022-05-23 PROCEDURE — 25010000002 CEFAZOLIN IN DEXTROSE 2-4 GM/100ML-% SOLUTION: Performed by: COLON & RECTAL SURGERY

## 2022-05-23 PROCEDURE — 25010000002 FENTANYL CITRATE (PF) 50 MCG/ML SOLUTION

## 2022-05-23 PROCEDURE — 25010000002 PROPOFOL 10 MG/ML EMULSION

## 2022-05-23 PROCEDURE — 25010000002 SUCCINYLCHOLINE PER 20 MG

## 2022-05-23 PROCEDURE — 25010000002 PHENYLEPHRINE 10 MG/ML SOLUTION

## 2022-05-23 PROCEDURE — 46260 REMOVE IN/EX HEM GROUPS 2+: CPT | Performed by: COLON & RECTAL SURGERY

## 2022-05-23 PROCEDURE — 25010000002 ONDANSETRON PER 1 MG

## 2022-05-23 PROCEDURE — 88304 TISSUE EXAM BY PATHOLOGIST: CPT | Performed by: COLON & RECTAL SURGERY

## 2022-05-23 RX ORDER — MAGNESIUM HYDROXIDE 1200 MG/15ML
LIQUID ORAL AS NEEDED
Status: DISCONTINUED | OUTPATIENT
Start: 2022-05-23 | End: 2022-05-23 | Stop reason: HOSPADM

## 2022-05-23 RX ORDER — LIDOCAINE HYDROCHLORIDE 10 MG/ML
0.5 INJECTION, SOLUTION EPIDURAL; INFILTRATION; INTRACAUDAL; PERINEURAL ONCE AS NEEDED
Status: DISCONTINUED | OUTPATIENT
Start: 2022-05-23 | End: 2022-05-23 | Stop reason: HOSPADM

## 2022-05-23 RX ORDER — GLYCOPYRROLATE 0.2 MG/ML
INJECTION INTRAMUSCULAR; INTRAVENOUS AS NEEDED
Status: DISCONTINUED | OUTPATIENT
Start: 2022-05-23 | End: 2022-05-23 | Stop reason: SURG

## 2022-05-23 RX ORDER — FLUMAZENIL 0.1 MG/ML
0.2 INJECTION INTRAVENOUS AS NEEDED
Status: DISCONTINUED | OUTPATIENT
Start: 2022-05-23 | End: 2022-05-23 | Stop reason: HOSPADM

## 2022-05-23 RX ORDER — EPHEDRINE SULFATE 50 MG/ML
5 INJECTION, SOLUTION INTRAVENOUS ONCE AS NEEDED
Status: DISCONTINUED | OUTPATIENT
Start: 2022-05-23 | End: 2022-05-23 | Stop reason: HOSPADM

## 2022-05-23 RX ORDER — NALOXONE HCL 0.4 MG/ML
0.2 VIAL (ML) INJECTION AS NEEDED
Status: DISCONTINUED | OUTPATIENT
Start: 2022-05-23 | End: 2022-05-23 | Stop reason: HOSPADM

## 2022-05-23 RX ORDER — ONDANSETRON 4 MG/1
4 TABLET, FILM COATED ORAL ONCE AS NEEDED
Status: DISCONTINUED | OUTPATIENT
Start: 2022-05-23 | End: 2022-05-23 | Stop reason: HOSPADM

## 2022-05-23 RX ORDER — SUCCINYLCHOLINE CHLORIDE 20 MG/ML
INJECTION INTRAMUSCULAR; INTRAVENOUS AS NEEDED
Status: DISCONTINUED | OUTPATIENT
Start: 2022-05-23 | End: 2022-05-23 | Stop reason: SURG

## 2022-05-23 RX ORDER — HYDROCODONE BITARTRATE AND ACETAMINOPHEN 7.5; 325 MG/1; MG/1
1 TABLET ORAL ONCE AS NEEDED
Status: DISCONTINUED | OUTPATIENT
Start: 2022-05-23 | End: 2022-05-23 | Stop reason: HOSPADM

## 2022-05-23 RX ORDER — DIPHENHYDRAMINE HCL 25 MG
25 CAPSULE ORAL
Status: DISCONTINUED | OUTPATIENT
Start: 2022-05-23 | End: 2022-05-23 | Stop reason: HOSPADM

## 2022-05-23 RX ORDER — DEXAMETHASONE SODIUM PHOSPHATE 10 MG/ML
INJECTION INTRAMUSCULAR; INTRAVENOUS AS NEEDED
Status: DISCONTINUED | OUTPATIENT
Start: 2022-05-23 | End: 2022-05-23 | Stop reason: SURG

## 2022-05-23 RX ORDER — SODIUM CHLORIDE 0.9 % (FLUSH) 0.9 %
3 SYRINGE (ML) INJECTION EVERY 12 HOURS SCHEDULED
Status: DISCONTINUED | OUTPATIENT
Start: 2022-05-23 | End: 2022-05-23 | Stop reason: HOSPADM

## 2022-05-23 RX ORDER — OXYCODONE AND ACETAMINOPHEN 7.5; 325 MG/1; MG/1
1 TABLET ORAL EVERY 4 HOURS PRN
Status: DISCONTINUED | OUTPATIENT
Start: 2022-05-23 | End: 2022-05-23 | Stop reason: HOSPADM

## 2022-05-23 RX ORDER — HYDROMORPHONE HYDROCHLORIDE 1 MG/ML
0.5 INJECTION, SOLUTION INTRAMUSCULAR; INTRAVENOUS; SUBCUTANEOUS
Status: DISCONTINUED | OUTPATIENT
Start: 2022-05-23 | End: 2022-05-23 | Stop reason: HOSPADM

## 2022-05-23 RX ORDER — ONDANSETRON 2 MG/ML
INJECTION INTRAMUSCULAR; INTRAVENOUS AS NEEDED
Status: DISCONTINUED | OUTPATIENT
Start: 2022-05-23 | End: 2022-05-23 | Stop reason: SURG

## 2022-05-23 RX ORDER — SODIUM CHLORIDE 0.9 % (FLUSH) 0.9 %
3-10 SYRINGE (ML) INJECTION AS NEEDED
Status: DISCONTINUED | OUTPATIENT
Start: 2022-05-23 | End: 2022-05-23 | Stop reason: HOSPADM

## 2022-05-23 RX ORDER — MIDAZOLAM HYDROCHLORIDE 1 MG/ML
0.5 INJECTION INTRAMUSCULAR; INTRAVENOUS
Status: DISCONTINUED | OUTPATIENT
Start: 2022-05-23 | End: 2022-05-23 | Stop reason: HOSPADM

## 2022-05-23 RX ORDER — FENTANYL CITRATE 50 UG/ML
INJECTION, SOLUTION INTRAMUSCULAR; INTRAVENOUS AS NEEDED
Status: DISCONTINUED | OUTPATIENT
Start: 2022-05-23 | End: 2022-05-23 | Stop reason: SURG

## 2022-05-23 RX ORDER — FENTANYL CITRATE 50 UG/ML
50 INJECTION, SOLUTION INTRAMUSCULAR; INTRAVENOUS
Status: DISCONTINUED | OUTPATIENT
Start: 2022-05-23 | End: 2022-05-23 | Stop reason: HOSPADM

## 2022-05-23 RX ORDER — ONDANSETRON 2 MG/ML
4 INJECTION INTRAMUSCULAR; INTRAVENOUS ONCE AS NEEDED
Status: DISCONTINUED | OUTPATIENT
Start: 2022-05-23 | End: 2022-05-23 | Stop reason: HOSPADM

## 2022-05-23 RX ORDER — HYDRALAZINE HYDROCHLORIDE 20 MG/ML
5 INJECTION INTRAMUSCULAR; INTRAVENOUS
Status: DISCONTINUED | OUTPATIENT
Start: 2022-05-23 | End: 2022-05-23 | Stop reason: HOSPADM

## 2022-05-23 RX ORDER — LIDOCAINE HYDROCHLORIDE 20 MG/ML
INJECTION, SOLUTION INFILTRATION; PERINEURAL AS NEEDED
Status: DISCONTINUED | OUTPATIENT
Start: 2022-05-23 | End: 2022-05-23 | Stop reason: SURG

## 2022-05-23 RX ORDER — DIPHENHYDRAMINE HYDROCHLORIDE 50 MG/ML
12.5 INJECTION INTRAMUSCULAR; INTRAVENOUS
Status: DISCONTINUED | OUTPATIENT
Start: 2022-05-23 | End: 2022-05-23 | Stop reason: HOSPADM

## 2022-05-23 RX ORDER — CEFAZOLIN SODIUM 2 G/100ML
2 INJECTION, SOLUTION INTRAVENOUS ONCE
Status: COMPLETED | OUTPATIENT
Start: 2022-05-23 | End: 2022-05-23

## 2022-05-23 RX ORDER — METRONIDAZOLE 500 MG/100ML
500 INJECTION, SOLUTION INTRAVENOUS ONCE
Status: COMPLETED | OUTPATIENT
Start: 2022-05-23 | End: 2022-05-23

## 2022-05-23 RX ORDER — HYDROCODONE BITARTRATE AND ACETAMINOPHEN 5; 325 MG/1; MG/1
TABLET ORAL
Qty: 24 TABLET | Refills: 0 | Status: SHIPPED | OUTPATIENT
Start: 2022-05-23 | End: 2022-06-09

## 2022-05-23 RX ORDER — EPHEDRINE SULFATE 50 MG/ML
INJECTION, SOLUTION INTRAVENOUS AS NEEDED
Status: DISCONTINUED | OUTPATIENT
Start: 2022-05-23 | End: 2022-05-23 | Stop reason: SURG

## 2022-05-23 RX ORDER — POLYETHYLENE GLYCOL 3350 17 G/17G
17 POWDER, FOR SOLUTION ORAL 2 TIMES DAILY
Start: 2022-05-23

## 2022-05-23 RX ORDER — FAMOTIDINE 10 MG/ML
20 INJECTION, SOLUTION INTRAVENOUS ONCE
Status: DISCONTINUED | OUTPATIENT
Start: 2022-05-23 | End: 2022-05-23 | Stop reason: HOSPADM

## 2022-05-23 RX ORDER — IBUPROFEN 600 MG/1
600 TABLET ORAL ONCE AS NEEDED
Status: DISCONTINUED | OUTPATIENT
Start: 2022-05-23 | End: 2022-05-23 | Stop reason: HOSPADM

## 2022-05-23 RX ORDER — ESMOLOL HYDROCHLORIDE 10 MG/ML
INJECTION INTRAVENOUS AS NEEDED
Status: DISCONTINUED | OUTPATIENT
Start: 2022-05-23 | End: 2022-05-23 | Stop reason: SURG

## 2022-05-23 RX ORDER — HYDROCODONE BITARTRATE AND ACETAMINOPHEN 5; 325 MG/1; MG/1
1 TABLET ORAL ONCE AS NEEDED
Status: DISCONTINUED | OUTPATIENT
Start: 2022-05-23 | End: 2022-05-23 | Stop reason: HOSPADM

## 2022-05-23 RX ORDER — SODIUM CHLORIDE, SODIUM LACTATE, POTASSIUM CHLORIDE, CALCIUM CHLORIDE 600; 310; 30; 20 MG/100ML; MG/100ML; MG/100ML; MG/100ML
9 INJECTION, SOLUTION INTRAVENOUS CONTINUOUS
Status: DISCONTINUED | OUTPATIENT
Start: 2022-05-23 | End: 2022-05-23 | Stop reason: HOSPADM

## 2022-05-23 RX ORDER — LABETALOL HYDROCHLORIDE 5 MG/ML
5 INJECTION, SOLUTION INTRAVENOUS
Status: DISCONTINUED | OUTPATIENT
Start: 2022-05-23 | End: 2022-05-23 | Stop reason: HOSPADM

## 2022-05-23 RX ORDER — PHENYLEPHRINE HYDROCHLORIDE 10 MG/ML
INJECTION INTRAVENOUS AS NEEDED
Status: DISCONTINUED | OUTPATIENT
Start: 2022-05-23 | End: 2022-05-23 | Stop reason: SURG

## 2022-05-23 RX ORDER — LIDOCAINE 50 MG/G
1 OINTMENT TOPICAL EVERY 4 HOURS PRN
Qty: 35.44 G | Refills: 4 | Status: SHIPPED | OUTPATIENT
Start: 2022-05-23 | End: 2022-05-27

## 2022-05-23 RX ORDER — PROPOFOL 10 MG/ML
VIAL (ML) INTRAVENOUS AS NEEDED
Status: DISCONTINUED | OUTPATIENT
Start: 2022-05-23 | End: 2022-05-23 | Stop reason: SURG

## 2022-05-23 RX ADMIN — PHENYLEPHRINE HYDROCHLORIDE 100 MCG: 10 INJECTION, SOLUTION INTRAVENOUS at 12:46

## 2022-05-23 RX ADMIN — FENTANYL CITRATE 50 MCG: 0.05 INJECTION, SOLUTION INTRAMUSCULAR; INTRAVENOUS at 12:34

## 2022-05-23 RX ADMIN — PHENYLEPHRINE HYDROCHLORIDE 100 MCG: 10 INJECTION, SOLUTION INTRAVENOUS at 12:30

## 2022-05-23 RX ADMIN — GLYCOPYRROLATE 0.2 MG: 0.2 INJECTION INTRAMUSCULAR; INTRAVENOUS at 12:24

## 2022-05-23 RX ADMIN — FENTANYL CITRATE 50 MCG: 0.05 INJECTION, SOLUTION INTRAMUSCULAR; INTRAVENOUS at 12:20

## 2022-05-23 RX ADMIN — SODIUM CHLORIDE, POTASSIUM CHLORIDE, SODIUM LACTATE AND CALCIUM CHLORIDE 9 ML/HR: 600; 310; 30; 20 INJECTION, SOLUTION INTRAVENOUS at 12:09

## 2022-05-23 RX ADMIN — LIDOCAINE HYDROCHLORIDE 100 MG: 20 INJECTION, SOLUTION INFILTRATION; PERINEURAL at 12:20

## 2022-05-23 RX ADMIN — CEFAZOLIN SODIUM 2 G: 2 INJECTION, SOLUTION INTRAVENOUS at 12:08

## 2022-05-23 RX ADMIN — SUCCINYLCHOLINE CHLORIDE 160 MG: 20 INJECTION, SOLUTION INTRAMUSCULAR; INTRAVENOUS; PARENTERAL at 12:21

## 2022-05-23 RX ADMIN — METRONIDAZOLE 500 MG: 500 INJECTION, SOLUTION INTRAVENOUS at 12:46

## 2022-05-23 RX ADMIN — METRONIDAZOLE 500 MG: 500 INJECTION, SOLUTION INTRAVENOUS at 12:08

## 2022-05-23 RX ADMIN — PROPOFOL 50 MG: 10 INJECTION, EMULSION INTRAVENOUS at 12:27

## 2022-05-23 RX ADMIN — DEXAMETHASONE SODIUM PHOSPHATE 8 MG: 10 INJECTION INTRAMUSCULAR; INTRAVENOUS at 12:24

## 2022-05-23 RX ADMIN — HYDROCODONE BITARTRATE AND ACETAMINOPHEN 1 TABLET: 5; 325 TABLET ORAL at 14:00

## 2022-05-23 RX ADMIN — ESMOLOL HYDROCHLORIDE 20 MG: 100 INJECTION, SOLUTION INTRAVENOUS at 12:59

## 2022-05-23 RX ADMIN — ONDANSETRON 4 MG: 2 INJECTION INTRAMUSCULAR; INTRAVENOUS at 12:48

## 2022-05-23 RX ADMIN — PROPOFOL 200 MG: 10 INJECTION, EMULSION INTRAVENOUS at 12:20

## 2022-05-23 RX ADMIN — EPHEDRINE SULFATE 5 MG: 50 INJECTION INTRAVENOUS at 12:40

## 2022-05-23 RX ADMIN — PHENYLEPHRINE HYDROCHLORIDE 100 MCG: 10 INJECTION, SOLUTION INTRAVENOUS at 12:40

## 2022-05-23 NOTE — ANESTHESIA POSTPROCEDURE EVALUATION
Patient: Ruben PAVON McRobb    Procedure Summary     Date: 05/23/22 Room / Location: [facility] OR 03 / [facility] MAIN OR    Anesthesia Start: 1213 Anesthesia Stop: 1309    Procedure: HEMORRHOIDECTOMY x 3 (N/A Anus) Diagnosis:       Internal hemorrhoids with complication      External hemorrhoid      (Internal hemorrhoids with complication [K64.8])      (External hemorrhoid [K64.4])    Surgeons: Yang Sullivan MD Provider: Nithin Cotton MD    Anesthesia Type: general ASA Status: 3          Anesthesia Type: general    Vitals  Vitals Value Taken Time   /80 05/23/22 1421   Temp 36.4 °C (97.6 °F) 05/23/22 1405   Pulse 92 05/23/22 1426   Resp 16 05/23/22 1420   SpO2 98 % 05/23/22 1426   Vitals shown include unvalidated device data.        Post Anesthesia Care and Evaluation    Patient location during evaluation: bedside  Patient participation: complete - patient participated  Level of consciousness: awake and alert  Pain management: adequate  Airway patency: patent  Anesthetic complications: No anesthetic complications    Cardiovascular status: acceptable  Respiratory status: acceptable  Hydration status: acceptable    Comments: /88   Pulse 96   Temp 36.4 °C (97.6 °F) (Oral)   Resp 16   SpO2 94%

## 2022-05-23 NOTE — DISCHARGE INSTRUCTIONS
Dr. Yang Sullivan  [address]  [place] [id number] [phone]      Discharge Instructions for Hemorrhoidectomy/Anal Fissures/Fistulas      Go home, rest and take it easy today; however, you should get up and move about several times today to reduce the risk of developing a clot in your legs.      You may experience some dizziness or memory loss from the anesthesia.  This may last for the next 24 hours.  Someone should plan on staying with you for the first 24 hours for your safety.    Do not make any important legal decisions or sign any legal papers for the next 24 hours.      Eat and drink lightly today.  Start off with liquids, jello, soup, crackers or other bland foods at first. Please drink plenty of fluids. You may advance your diet tomorrow as tolerated as long as you do not experience any nausea or vomiting.     Some patients will have packing in their rectum.  It should come out will your first bowel movement.  You may remove it sooner yourself if it bothers you.  If it comes out on its own before your first bowel movement, do not worry about it.  It does not need to be replaced.      Begin your sitz baths tomorrow.    The best method of pain relief is a sitz bath (sitting in a tub or warm water) at least 3 times daily for 10 minutes.  This helps to reduce pain and aids with hygiene/drainage.  The drainage may have an unpleasant odor.  This is not unexpected and should be controlled with baths and showers.  If the skin around the anal area becomes irritated, you may apply Vaseline, A&D ointment or a similar barrier cream to the area.       Bleeding and drainage are to be expected and may persist for as long as 2-4 weeks.  Bleeding may occur with your bowel movements as well.  Wear a cotton liner such as a Kotex pad or a panty liner inside your underwear to protect your clothing.       You have received a prescription for a narcotic pain medicine, as you will have pain following surgery.    You will not be totally pain free, but your pain medicine should make the pain tolerable.  Please take your pain medicine as prescribed and always take your pills with food to prevent nausea. Your pain may persist for 1-3 weeks. If you are having severe pain that cannot be controlled by the pain medicine, please contact me.  Typically, patients with anal fissures will have less pain than those with hemorrhoids.      The goal is for your bowel movements to be soft which will help to minimize pain.  The pain medicine used to keep your comfortable may also cause some constipation so I recommend the following:    Miralax (17 grams)--1 capfull every day starting the day after surgery.    Keep taking fiber everyday (Citrucel, Metamucil, or Fiber-con) as directed.    If you are unable to have a bowel movement by 2 days after surgery, try Milk of Magnesia, Magnesium Citrate, or Colace.  If still unable to have a bowel movement, call the office at [phone]      No driving for 24 hours and for as long as you are taking your prescription pain medicine.  You may resume your activities gradually.       You will need to call the office at [phone] to schedule a follow-up appointment in 10-21 days.    Remember to contact me for any of the following:    Fever > 100.5 degrees  Severe pain that cannot be controlled by taking your pain pills  Severe nausea or vomiting   Significant bleeding > 1/4 cup  Any other questions or concerns

## 2022-05-23 NOTE — ANESTHESIA PREPROCEDURE EVALUATION
Anesthesia Evaluation     Patient summary reviewed and Nursing notes reviewed                Airway   Mallampati: III  Neck ROM: limited  Dental    (+) lower dentures and partials    Pulmonary    (+) shortness of breath,   Cardiovascular     ECG reviewed  Rhythm: regular  Rate: normal    (+) hypertension, hyperlipidemia,       Neuro/Psych  (+) numbness, psychiatric history Anxiety and Depression,    GI/Hepatic/Renal/Endo    (+) morbid obesity, GI bleeding , renal disease stones, diabetes mellitus type 2,     Musculoskeletal (-) negative ROS    Abdominal    Substance History - negative use     OB/GYN negative ob/gyn ROS         Other                      Anesthesia Plan    ASA 3     general   (CMAC for GETA    BMI    Lower partial dentures have been removed    I have reviewed the patient's history with the patient and the chart, including all pertinent laboratory results and imaging. I have explained the risks of anesthesia including but not limited to dental damage, corneal abrasion, nerve injury, MI, stroke, and death. Questions asked and answered. Anesthetic plan discussed with patient and team as indicated. Patient expressed understanding of the above.  )  intravenous induction     Anesthetic plan, all risks, benefits, and alternatives have been provided, discussed and informed consent has been obtained with: patient.    Plan discussed with CRNA.        CODE STATUS:        Medicare Wellness Visit  Plan for Preventive Care    A good way for you to stay healthy is to use preventive care.  Medicare covers many services that can help you stay healthy.* The goal of these services is to find any health problems as quickly as possible. Finding problems early can help make them easier to treat.  Your personal plan below lists the services you may need and when they are due.     Health Maintenance Summary     Pneumococcal Vaccine 0-64 (1 of 4 - PCV13)  Overdue - never done    Shingles Vaccine (1 of 2)  Overdue - never done    Lung Cancer Screening (Yearly)  Overdue since 3/19/2019    Breast Cancer Screening (Every 2 Years)  Overdue since 4/16/2020    COVID-19 Vaccine (3 - Pfizer risk 3-dose series)  Overdue since 5/1/2021    Influenza Vaccine (1)  Overdue - never done    Medicare Wellness Visit (Yearly)  Due since 10/13/2021    DTaP/Tdap/Td Vaccine (2 - Td or Tdap)  Next due on 6/24/2023    Colorectal Cancer Screen- (Colonoscopy - Every 10 Years)  Next due on 12/27/2027    Hepatitis C Screening   Completed    Hepatitis B Vaccine   Aged Out    Meningococcal Vaccine   Aged Out    HPV Vaccine   Aged Out           Preventive Care for Women and Men    Heart Screenings (Cardiovascular):  · Blood tests are used to check your cholesterol, lipid and triglyceride levels. High levels can increase your risk for heart disease and stroke. High levels can be treated with medications, diet and exercise. Lowering your levels can help keep your heart and blood vessels healthy.  Your provider will order these tests if they are needed.    · An ultrasound is done to see if you have an abdominal aortic aneurysm (AAA).  This is an enlargement of one of the main blood vessels that delivers blood to the body.   In the [place], 9,000 deaths are caused by AAA.  You may not even know you have this problem and as many as 1 in 3 people will have a serious problem if it is not treated.  Early diagnosis allows for more  effective treatment and cure.  If you have a family history of AAA or are a male age 65-75 who has smoked, you are at higher risk of an AAA.  Your provider can order this test, if needed.    Colorectal Screening:  · There are many tests that are used to check for cancer of your colon and rectum. You and your provider should discuss what test is best for you and when to have it done.  Options include:  · Screening Colonoscopy: exam of the entire colon, seen through a flexible lighted tube.  · Flexible Sigmoidoscopy: exam of the last third (sigmoid portion) of the colon and rectum, seen through a flexible lighted tube.  · Cologuard DNA stool test: a sample of your stool is used to screen for cancer and unseen blood in your stool.  · Fecal Occult Blood Test: a sample of your stool is studied to find any unseen blood    Flu Shot:  · An immunization that helps to prevent influenza (the flu). You should get this every year. The best time to get the shot is in the fall.    Pneumococcal Shot:  • Vaccines are available that can help prevent pneumococcal disease, which is any type of infection caused by Streptococcus pneumoniae bacteria.   Their use can prevent some cases of pneumonia, meningitis, and sepsis. There are two types of pneumococcal vaccines:   o Conjugate vaccines (PCV-13 or Prevnar 13®) - helps protect against the 13 types of pneumococcal bacteria that are the most common causes of serious infections in children and adults.    o Polysaccharide vaccine (PPSV23 or Euexhubas49®) - helps protect against 23 types of pneumococcal bacteria for patients who are recommended to get it.  These vaccines should be given at least 12 months apart.  A booster is usually not needed.     Hepatitis B Shot:  · An immunization that helps to protect people from getting Hepatitis B. Hepatitis B is a virus that spreads through contact with infected blood or body fluids. Many people with the virus do not have symptoms.  The virus can  lead to serious problems, such as liver disease. Some people are at higher risk than others. Your doctor will tell you if you need this shot.     Diabetes Screening:  · A test to measure sugar (glucose) in your blood is called a fasting blood sugar. Fasting means you cannot have food or drink for at least 8 hours before the test. This test can detect diabetes long before you may notice symptoms.    Glaucoma Screening:  · Glaucoma screening is performed by your eye doctor. The test measures the fluid pressure inside your eyes to determine if you have glaucoma.     Hepatitis C Screening:  · A blood test to see if you have the hepatitis C virus.  Hepatitis C attacks the liver and is a major cause of chronic liver disease.  Medicare will cover a single screening for all adults born between 1945 & 1965, or high risk patients (people who have injected illegal drugs or people who have had blood transfusions).  High risk patients who continue to inject illegal drugs can be screened for Hepatitis C every year.    Smoking and Tobacco-Use Cessation Counseling:  · Tobacco is the single greatest cause of disease and early death in our country today. Medication and counseling together can increase a person’s chance of quitting for good.   · Medicare covers two quitting attempts per year, with four counseling sessions per attempt (eight sessions in a 12 month period)    Preventive Screening tests for Women    Screening Mammograms and Breast Exams:  · An x-ray of your breasts to check for breast cancer before you or your doctor may be able to feel it.  If breast cancer is found early it can usually be treated with success.    Pelvic Exams and Pap Tests:  · An exam to check for cervical and vaginal cancer. A Pap test is a lab test in which cells are taken from your cervix and sent to the lab to look for signs of cervical cancer. If cancer of the cervix is found early, chances for a cure are good. Testing can generally end at age 65,  or if a woman has a hysterectomy for a benign condition. Your provider may recommend more frequent testing if certain abnormal results are found.    Bone Mass Measurements:  · A painless x-ray of your bone density to see if you are at risk for a broken bone. Bone density refers to the thickness of bones or how tightly the bone tissue is packed.    Preventive Screening tests for Men    Prostate Screening:  · Should you have a prostate cancer test (PSA)?  It is up to you to decide if you want a prostate cancer test. Talk to your clinician to find out if the test is right for you.  Things for you to consider and talk about should include:  · Benefits and harms of the test  · Your family history  · How your race/ethnicity may influence the test  · If the test may impact other medical conditions you have  · Your values on screenings and treatments    *Medicare pays for many preventive services to keep you healthy. For some of these services, you might have to pay a deductible, coinsurance, and / or copayment.  The amounts vary depending on the type of services you need and the kind of Medicare health plan you have.    For further details on screenings offered by Medicare please visit: https://www.medicare.gov/coverage/preventive-screening-services   Mediterranean Diet      This guide has been prepared for your use by registered dietitians. If you have questions or concerns, please call the nearest [facility] facility to contact a dietitian. Diet counseling is available to discuss your specific needs.    What is the Mediterranean Diet?    The Mediterranean region includes three continents and more than 15 countries. People from [place], [place], [place], [place] and other Mediterranean  countries traditionally eat a diet consisting mainly of grains, legumes, fruits, vegetables, nuts, seeds and olive oil.  Key components of the Mediterranean Diet include:  • Eating foods primarily from plant sources, such asfruits and vegetables,  whole grains, legumes, nutsand seeds  • A variety of minimally processed and, wherever possible, seasonally fresh and locally grown foods  • Replacing butter and margarine with healthy fats,such as olive oil  • Using herbs and spices instead of salt to flavor foods  • Eating fish and poultry at least twice weekly  • Daily consumption of low to moderate amounts of cheese and yogurt  • No more than four eggs per week (including those used in cooking and baking)  • Fresh fruit as the typical daily dessert; sweets with a significant amount of sugar and saturated fat consumed no more than a few times a month  • Red meat is only eaten a few times per month in small portions  • Moderate consumption of wine, normally with meals; about one to two glasses per day for men and one glass per day for women, however, individuals should only drink wine if they are medically able to do so, and should ask their doctors for more information.  • Sharing healthy meals as a celebration with family and friends  • Regular physical activity at a level that promotes a healthy weight, fitness and well-being    Why should I follow the Mediterranean Diet?    Researchers have found that a higher intake of monounsaturated and polyunsaturated fat and lower intake of saturated fat and trans-fatty acids is associated with a decreased risk of heart disease and cancer.  • Saturated fat tends to be hard fat, such as the fat in red meat, butter, cheese, whole milk and ice cream.  • Trans-fatty acids are formed when vegetable oils are hardened or hydrogenated to form margarine or shortening.  • Monounsaturated and polyunsaturated fats are liquid at room temperature and are found in plant sources. The fat in olive oil is monounsaturated.    What is the Mediterranean Diet Pyramid?    The Mediterranean Diet Pyramid emphasizes making the base foundation of your meals grains (mostly whole), fruits, vegetables, legumes, nuts, herbs, spices and olive oil. Fish,  poultry, eggs, cheese and yogurt are consumed regularly but in low to moderate amounts. Red meat and sweets are listed at the top of the pyramid meaning they should be eaten only a few times a month. The Mediterranean Diet also recommends drinking adequate amounts of water each day and the use of red wine in moderation.    Is the Mediterranean Diet good for everyone?    The pyramid describes a diet for most healthy adults. If overweight or obese, focus should then be on eating more grains, fruits, vegetables and legumes, and less meat, sweets and total fat. Some type of physical activity, such as walking or stair climbing, should be included every day. [id number] (04/12) ©[facility] Nutrition > Special Diets  Mediterranean Diet,    Tips for Following the Mediterranean Diet    Bread, potatoes, pasta, rice and other cereals  • Build your diet from a base of grains - rice, pasta, couscous  • Learn to cook couscous, bulgur, barley, polenta and other interesting grains  • Add legumes - chickpeas, lentils, peanuts, beans and peas - to your meals  • Choose whole-grain varieties most often for extra fiber.    Fruits and vegetables  10 SERVINGS TOTAL DAILY.  A serving is 1/2 cup.    • Choose a wide variety of fruits and vegetables, various colors.   • Top pasta, rice or even pizza with steamed or fresh vegetables  • Add popular Mediterranean ingredients like garlic, fresh herbs, chopped onions, ade and grated lemon or orange zest to dishes  • Try baking garlic until it is soft and aldrich. It adds a creamy, rich flavor to pasta, rice and bean dishes.  • Make fresh fruit your daily dessert     Fish, poultry, eggs and meat  • Limit fatty meat - it is high in saturated fat  • Choose lower fat alternatives such as poultry, fish or lean red meat  • Eat low to moderate amounts of fish, poultry and eggs a few times a week  • Eat fish, including oily fish such as mackerel, herring, tuna and salmon at least three times a week  • Limit red  meat to only a few times per month; slice it in stir-fried dishes so it goes a long way    Milk and dairy products  • Eat low to moderate amounts of cheese and yogurt every day  • Choose lower fat varieties such as low fat yogurt and skim milk  • Use a sprinkling or thin shaving of sharp or imported cheese rather than eating more of less flavorful cheese    Nuts and seeds  • Choose a variety of nuts and seeds - almonds, hazelnuts, pistachios, walnuts and sesame seeds (1 ounce daily)  • Add a small amount of chopped nuts to salads, pasta or other grain dishes for a crunchy texture    Fats  • Substitute olive oil, rich in monounsaturated fat, for butter and other animal fats, which are high in saturated fats (3 tablespoons of olive oil mixed in food daily).  • Use olive oil for salad dressings, marinades, sautéing and stir-frying meats, fish and vegetables  • Limit foods that are fried to only a few times a month  • Drizzle small amounts of olive oil on bread, rice and pastas    Sweets  • Eat only small amounts of foods that contain large amounts of sugar and saturated fat, such as cakes and pastries; limit these foods to only a few times a month    Wine  • Drink red wine in moderation - one to two 5-ounce glasses per day for men, one 5-ounce glass for women  • Avoid wine if it would put you or others at risk, including during pregnancy and before driving    Recommended Web sites for Mediterranean Recipes; Allrecipes.com, www.epicurious.com (Search for “Mediterranean” on each site)  A registered dietitian can help, Diet counseling with a registered dietitian may include information on:  • Label reading, shopping, food preparation, adjusting recipes  • Dining out  Tips for Following the Mediterranean Diet  For a list of [facility] facilities with a dietitian, please call [facility] toll free at [phone] [phone] (04/12) [facility] Nutrition > Special Diets  The information presented is intended for general information  and educational purposes. It is not intended to replace the advice of your health care provider. Contact your health care provider if you believe you have a health problem.  [facility] is a not-for-profit health care provider and a national leader in efforts to improve the quality of health care.

## 2022-05-23 NOTE — ANESTHESIA PROCEDURE NOTES
Airway  Urgency: elective    Date/Time: 5/23/2022 12:23 PM  Airway not difficult    General Information and Staff    Patient location during procedure: OR  Anesthesiologist: Nithin Cotton MD  CRNA/CAA: Jena Silva CRNA    Indications and Patient Condition  Indications for airway management: airway protection    Preoxygenated: yes  Mask difficulty assessment: 1 - vent by mask    Final Airway Details  Final airway type: endotracheal airway      Successful airway: ETT  Cuffed: yes   Successful intubation technique: direct laryngoscopy  Facilitating devices/methods: intubating stylet and cricoid pressure  Endotracheal tube insertion site: oral  Blade: Brenda  Blade size: 4  ETT size (mm): 7.5  Cormack-Lehane Classification: grade IIa - partial view of glottis  Placement verified by: chest auscultation and capnometry   Cuff volume (mL): 22  Measured from: lips  Number of attempts at approach: 1  Assessment: lips, teeth, and gum same as pre-op and atraumatic intubation    Additional Comments  Airway exam prior to DL, teeth/lips inspected. Preoxygenated with 100% O2; sniffing position, easy mask ventilation. Eyes taped. Atraumatic intubation. Lips and teeth intact, no damage. ETT connected to vent. Confirmed EBBS, +EtCO2.

## 2022-05-23 NOTE — ADDENDUM NOTE
Addendum  created 05/23/22 1642 by Jena Silva CRNA    Clinical Note Signed, Flowsheet accepted, Intraprocedure Blocks edited, Intraprocedure Event edited, Intraprocedure Flowsheets edited, Intraprocedure Meds edited

## 2022-05-24 LAB
LAB AP CASE REPORT: NORMAL
PATH REPORT.FINAL DX SPEC: NORMAL
PATH REPORT.GROSS SPEC: NORMAL

## 2022-05-27 ENCOUNTER — DOCUMENTATION (OUTPATIENT)
Dept: SURGERY | Facility: CLINIC | Age: 77
End: 2022-05-27

## 2022-05-27 ENCOUNTER — TELEPHONE (OUTPATIENT)
Dept: SURGERY | Facility: CLINIC | Age: 77
End: 2022-05-27

## 2022-05-27 RX ORDER — LIDOCAINE 50 MG/G
OINTMENT TOPICAL
Qty: 30 G | Refills: 4 | Status: SHIPPED | OUTPATIENT
Start: 2022-05-27 | End: 2022-05-27

## 2022-05-27 RX ORDER — LIDOCAINE 50 MG/G
OINTMENT TOPICAL
Qty: 35.44 G | Refills: 4 | Status: SHIPPED | OUTPATIENT
Start: 2022-05-27

## 2022-05-27 NOTE — TELEPHONE ENCOUNTER
[facility]    [facility]    He will  tomorrow at [facility] pharmacy since they are open from 8 to 4 pm.    Thank you so much.

## 2022-05-27 NOTE — PROGRESS NOTES
Phoned pt told him that I have faxed over print out to [facility] pharmacy to delete order & that I had printed out the rx that was sent over to [facility] to call them in the next 30 minutes because I wasn't sure if they would see the order 1st in computer or the fax, so they can verify with the [facility] pharmacy and make arrangements to .    Thank you.

## 2022-05-27 NOTE — TELEPHONE ENCOUNTER
[facility]    Thank you.    At [facility] was charging him $68.    Contact [phone]

## 2022-05-27 NOTE — PROGRESS NOTES
HUB called the office that a pharmacist was on the line to speak with Bernadette Butler, I had placed her on hold then when I came back she said they hung up.    [facility] and no one could help me, finally called again & spoke with sunny Llanos and said may the problem is if they did not refer pt to our office they can not fill the Lidocaine 5 % for pt.     So I tried calling the pt twice and no answer from both #'s on file, but I did leave a message in each phone explaining to him what [facility], I suggested if he couldn't get the Lidocaine filled due to the holiday weekend to  OTC Lidocaine until we return to office next week. I did tell him to call me back in the office.    He said when he first called that [facility] wanted to charge him $68.00 and he did not want to pay that, he said he got it at [facility] [place] pharmacy and it was $10.00 so he asked [facility] that [facility] not [facility].    So I left message for pt to call office.    Thank you.

## 2022-05-27 NOTE — TELEPHONE ENCOUNTER
Tried calling patient twice at both #'s on file and no answer left him a voice message at both #'s to call office.    Thank you.

## 2022-06-09 ENCOUNTER — OFFICE VISIT (OUTPATIENT)
Dept: SURGERY | Facility: CLINIC | Age: 77
End: 2022-06-09

## 2022-06-09 VITALS
BODY MASS INDEX: 29.68 KG/M2 | HEIGHT: 69 IN | HEART RATE: 83 BPM | DIASTOLIC BLOOD PRESSURE: 74 MMHG | SYSTOLIC BLOOD PRESSURE: 132 MMHG | OXYGEN SATURATION: 97 % | TEMPERATURE: 97.8 F | WEIGHT: 200.4 LBS

## 2022-06-09 DIAGNOSIS — K64.4 EXTERNAL HEMORRHOID: ICD-10-CM

## 2022-06-09 DIAGNOSIS — K64.8 INTERNAL HEMORRHOIDS WITH COMPLICATION: Primary | ICD-10-CM

## 2022-06-09 PROCEDURE — 99024 POSTOP FOLLOW-UP VISIT: CPT | Performed by: PHYSICIAN ASSISTANT

## 2022-06-09 RX ORDER — CLINDAMYCIN PHOSPHATE 10 UG/ML
LOTION TOPICAL
COMMUNITY
Start: 2022-06-01

## 2022-06-09 RX ORDER — GLIPIZIDE 5 MG/1
0.5 TABLET ORAL
COMMUNITY
Start: 2022-05-23

## 2022-06-09 NOTE — PROGRESS NOTES
"Ruben Breen is a 76 y.o. male in for follow up of hemorrhoids status post internal and external hemorrhoidectomy x3 on 5/23/2022.    Doing very well. No bleeding. Very little drainage a few days ago. No problems. BMs haven't felt this good for 15 years. Taking metamucil. Weaned off miralax. Daily BMs, [place] 3-4. Patient is extremely gracious and strongly desires for Dr. Sullivan to know how grateful he is for her help and healing for an excellent result after many years of suffering.    /74 (BP Location: Left arm, Patient Position: Sitting, Cuff Size: Large Adult)   Pulse 83   Temp 97.8 °F (36.6 °C)   Ht 175.3 cm (69\")   Wt 90.9 kg (200 lb 6.4 oz)   SpO2 97%   BMI 29.59 kg/m²   Body mass index is 29.59 kg/m².  Vitals reviewed    PE:  Physical Exam  Vitals reviewed. Exam conducted with a chaperone present.   Constitutional:       General: He is not in acute distress.     Appearance: Normal appearance.   HENT:      Head: Normocephalic and atraumatic.   Eyes:      Extraocular Movements: Extraocular movements intact.   Cardiovascular:      Rate and Rhythm: Normal rate.   Pulmonary:      Effort: Pulmonary effort is normal.   Genitourinary:     Comments: Perianal exam: no induration or erythema. Moderate appropriate drainage.   Musculoskeletal:         General: Normal range of motion.      Cervical back: Normal range of motion.   Skin:     General: Skin is warm and dry.   Neurological:      General: No focal deficit present.      Mental Status: He is alert.   Psychiatric:         Mood and Affect: Mood normal.         Behavior: Behavior normal.       Pathology reviewed:  1. Left Lateral Hemorrhoid:                 A. Consistent with hemorrhoid.     2. Right Anterior Hemorrhoids:               A. Consistent with hemorrhoid with organizing vascular thrombi.     3. Right Posterior Hemorrhoid:               A. Fibroepithelial polyp with reactive squamous hyperplasia and focal vascular congestion.               " B. No epithelial dysplasia nor malignancy identified.    Assessment:   1. Internal hemorrhoids with complication    2. External hemorrhoid     status post internal and external hemorrhoidectomy x3 on 5/23/2022    Plan:  Follow up PRN

## (undated) DEVICE — SENSR O2 OXIMAX FNGR A/ 18IN NONSTR

## (undated) DEVICE — TUBING, SUCTION, 1/4" X 10', STRAIGHT: Brand: MEDLINE

## (undated) DEVICE — SPNG GZ WOVN 4X4IN 12PLY 10/BX STRL

## (undated) DEVICE — ADAPT CLN BIOGUARD AIR/H2O DISP

## (undated) DEVICE — PANTY KNIT WASHABLE LG/XL BRN/GRN LF

## (undated) DEVICE — LOU MINOR PROCEDURE: Brand: MEDLINE INDUSTRIES, INC.

## (undated) DEVICE — SPNG LAP 18X18IN LF STRL PK/5

## (undated) DEVICE — NDL HYPO ECLPS SFTY 22G 1 1/2IN

## (undated) DEVICE — TRAP FLD MINIVAC MEGADYNE 100ML

## (undated) DEVICE — PREP SOL POVIDONE/IODINE BT 4OZ

## (undated) DEVICE — CANN O2 ETCO2 FITS ALL CONN CO2 SMPL A/ 7IN DISP LF

## (undated) DEVICE — ENSEAL TRIO TEMPERATURE CONTOLLED TISSUE SEALING TECHNOLOGY DISPOSABLE TISSUE SEALING DEVICE TAPTRONIC TRIGGER ACTIVATED POWER 3MM CURVED JAW: Brand: ENSEAL

## (undated) DEVICE — STERILE LATEX POWDER-FREE SURGICAL GLOVESWITH NITRILE COATING: Brand: PROTEXIS

## (undated) DEVICE — GOWN,SIRUS,NON REINFRCD,LARGE,SET IN SL: Brand: MEDLINE

## (undated) DEVICE — GOWN SURG AERO CHROME XL

## (undated) DEVICE — PENCL ES MEGADINE EZ/CLEAN BUTN W/HOLSTR 10FT

## (undated) DEVICE — ANTIBACTERIAL UNDYED BRAIDED (POLYGLACTIN 910), SYNTHETIC ABSORBABLE SUTURE: Brand: COATED VICRYL

## (undated) DEVICE — LN SMPL CO2 SHTRM SD STREAM W/M LUER

## (undated) DEVICE — KT ORCA ORCAPOD DISP STRL

## (undated) DEVICE — PATIENT RETURN ELECTRODE, SINGLE-USE, CONTACT QUALITY MONITORING, ADULT, WITH 9FT CORD, FOR PATIENTS WEIGING OVER 33LBS. (15KG): Brand: MEGADYNE

## (undated) DEVICE — GLV SURG SENSICARE PI LF PF 7.5 GRN STRL

## (undated) DEVICE — SMOKE EVACUATION TUBING WITH 8 IN INTEGRAL WAND AND SPONGE GUARD: Brand: BUFFALO FILTER

## (undated) DEVICE — DRP SURG UTIL W/TPE 2/LAYR 15X26IN DISP